# Patient Record
Sex: FEMALE | Race: WHITE | NOT HISPANIC OR LATINO | Employment: FULL TIME | URBAN - METROPOLITAN AREA
[De-identification: names, ages, dates, MRNs, and addresses within clinical notes are randomized per-mention and may not be internally consistent; named-entity substitution may affect disease eponyms.]

---

## 2017-01-27 ENCOUNTER — TRANSCRIBE ORDERS (OUTPATIENT)
Dept: ADMINISTRATIVE | Facility: HOSPITAL | Age: 57
End: 2017-01-27

## 2017-01-27 DIAGNOSIS — Z12.31 VISIT FOR SCREENING MAMMOGRAM: Primary | ICD-10-CM

## 2017-02-09 ENCOUNTER — APPOINTMENT (OUTPATIENT)
Dept: MAMMOGRAPHY | Facility: HOSPITAL | Age: 57
End: 2017-02-09
Attending: INTERNAL MEDICINE

## 2017-02-09 ENCOUNTER — APPOINTMENT (OUTPATIENT)
Dept: BONE DENSITY | Facility: HOSPITAL | Age: 57
End: 2017-02-09
Attending: INTERNAL MEDICINE

## 2017-02-22 ENCOUNTER — HOSPITAL ENCOUNTER (OUTPATIENT)
Dept: MAMMOGRAPHY | Facility: HOSPITAL | Age: 57
Discharge: HOME | End: 2017-02-22
Attending: INTERNAL MEDICINE | Admitting: INTERNAL MEDICINE

## 2017-02-22 ENCOUNTER — HOSPITAL ENCOUNTER (OUTPATIENT)
Dept: BONE DENSITY | Facility: HOSPITAL | Age: 57
Discharge: HOME | End: 2017-02-22
Attending: INTERNAL MEDICINE

## 2017-02-22 DIAGNOSIS — Z12.31 VISIT FOR SCREENING MAMMOGRAM: ICD-10-CM

## 2017-02-22 PROCEDURE — 77063 BREAST TOMOSYNTHESIS BI: CPT | Performed by: RADIOLOGY

## 2017-02-22 PROCEDURE — 77080 DXA BONE DENSITY AXIAL: CPT | Performed by: RADIOLOGY

## 2017-02-22 PROCEDURE — 77063 BREAST TOMOSYNTHESIS BI: CPT

## 2017-02-22 PROCEDURE — 77067 SCR MAMMO BI INCL CAD: CPT | Performed by: RADIOLOGY

## 2017-02-22 PROCEDURE — G0202 SCR MAMMO BI INCL CAD: HCPCS

## 2017-02-22 PROCEDURE — 77080 DXA BONE DENSITY AXIAL: CPT

## 2018-07-02 ENCOUNTER — HOSPITAL ENCOUNTER (INPATIENT)
Facility: HOSPITAL | Age: 58
LOS: 2 days | Discharge: HOME | End: 2018-07-04
Attending: EMERGENCY MEDICINE | Admitting: INTERNAL MEDICINE

## 2018-07-02 ENCOUNTER — APPOINTMENT (OUTPATIENT)
Dept: CT IMAGING | Facility: HOSPITAL | Age: 58
End: 2018-07-02

## 2018-07-02 DIAGNOSIS — R10.30 LOWER ABDOMINAL PAIN: ICD-10-CM

## 2018-07-02 DIAGNOSIS — K52.9 COLITIS, ACUTE: ICD-10-CM

## 2018-07-02 DIAGNOSIS — K92.2 ACUTE LOWER GI BLEEDING: Primary | ICD-10-CM

## 2018-07-02 PROBLEM — E78.5 HYPERLIPIDEMIA: Status: ACTIVE | Noted: 2018-07-02

## 2018-07-02 PROBLEM — I10 ESSENTIAL HYPERTENSION: Status: ACTIVE | Noted: 2018-07-02

## 2018-07-02 PROBLEM — D72.829 LEUKOCYTOSIS: Status: ACTIVE | Noted: 2018-07-02

## 2018-07-02 PROBLEM — E86.0 DEHYDRATION: Status: ACTIVE | Noted: 2018-07-02

## 2018-07-02 LAB
ABO GROUP BLD: NORMAL
ABO GROUP BLD: NORMAL
ALBUMIN SERPL-MCNC: 5.22 G/DL (ref 3.2–4.8)
ALBUMIN/GLOB SERPL: 1.7 G/DL (ref 1.5–2.5)
ALP SERPL-CCNC: 55 U/L (ref 25–100)
ALT SERPL W P-5'-P-CCNC: 33 U/L (ref 7–40)
ANION GAP SERPL CALCULATED.3IONS-SCNC: 12 MMOL/L (ref 3–11)
AST SERPL-CCNC: 27 U/L (ref 0–33)
BASOPHILS # BLD AUTO: 0.01 10*3/MM3 (ref 0–0.2)
BASOPHILS NFR BLD AUTO: 0.1 % (ref 0–1)
BILIRUB SERPL-MCNC: 1.1 MG/DL (ref 0.3–1.2)
BLD GP AB SCN SERPL QL: NEGATIVE
BUN BLD-MCNC: 17 MG/DL (ref 9–23)
BUN/CREAT SERPL: 19.1 (ref 7–25)
CALCIUM SPEC-SCNC: 10.5 MG/DL (ref 8.7–10.4)
CHLORIDE SERPL-SCNC: 102 MMOL/L (ref 99–109)
CO2 SERPL-SCNC: 27 MMOL/L (ref 20–31)
CREAT BLD-MCNC: 0.89 MG/DL (ref 0.6–1.3)
DEPRECATED RDW RBC AUTO: 42.1 FL (ref 37–54)
EOSINOPHIL # BLD AUTO: 0 10*3/MM3 (ref 0–0.3)
EOSINOPHIL NFR BLD AUTO: 0 % (ref 0–3)
ERYTHROCYTE [DISTWIDTH] IN BLOOD BY AUTOMATED COUNT: 12.8 % (ref 11.3–14.5)
GFR SERPL CREATININE-BSD FRML MDRD: 65 ML/MIN/1.73
GLOBULIN UR ELPH-MCNC: 3.1 GM/DL
GLUCOSE BLD-MCNC: 154 MG/DL (ref 70–100)
HCT VFR BLD AUTO: 49.8 % (ref 34.5–44)
HGB BLD-MCNC: 16.6 G/DL (ref 11.5–15.5)
HOLD SPECIMEN: NORMAL
HOLD SPECIMEN: NORMAL
IMM GRANULOCYTES # BLD: 0.05 10*3/MM3 (ref 0–0.03)
IMM GRANULOCYTES NFR BLD: 0.3 % (ref 0–0.6)
LYMPHOCYTES # BLD AUTO: 0.69 10*3/MM3 (ref 0.6–4.8)
LYMPHOCYTES NFR BLD AUTO: 4.4 % (ref 24–44)
MCH RBC QN AUTO: 29.9 PG (ref 27–31)
MCHC RBC AUTO-ENTMCNC: 33.3 G/DL (ref 32–36)
MCV RBC AUTO: 89.6 FL (ref 80–99)
MONOCYTES # BLD AUTO: 0.87 10*3/MM3 (ref 0–1)
MONOCYTES NFR BLD AUTO: 5.5 % (ref 0–12)
NEUTROPHILS # BLD AUTO: 14.08 10*3/MM3 (ref 1.5–8.3)
NEUTROPHILS NFR BLD AUTO: 89.7 % (ref 41–71)
PLATELET # BLD AUTO: 332 10*3/MM3 (ref 150–450)
PMV BLD AUTO: 8.6 FL (ref 6–12)
POTASSIUM BLD-SCNC: 4.2 MMOL/L (ref 3.5–5.5)
PROT SERPL-MCNC: 8.3 G/DL (ref 5.7–8.2)
RBC # BLD AUTO: 5.56 10*6/MM3 (ref 3.89–5.14)
RH BLD: POSITIVE
RH BLD: POSITIVE
SODIUM BLD-SCNC: 141 MMOL/L (ref 132–146)
T&S EXPIRATION DATE: NORMAL
WBC NRBC COR # BLD: 15.7 10*3/MM3 (ref 3.5–10.8)
WHOLE BLOOD HOLD SPECIMEN: NORMAL
WHOLE BLOOD HOLD SPECIMEN: NORMAL

## 2018-07-02 PROCEDURE — 99285 EMERGENCY DEPT VISIT HI MDM: CPT

## 2018-07-02 PROCEDURE — 86901 BLOOD TYPING SEROLOGIC RH(D): CPT

## 2018-07-02 PROCEDURE — 74177 CT ABD & PELVIS W/CONTRAST: CPT

## 2018-07-02 PROCEDURE — 86850 RBC ANTIBODY SCREEN: CPT

## 2018-07-02 PROCEDURE — 86900 BLOOD TYPING SEROLOGIC ABO: CPT

## 2018-07-02 PROCEDURE — 25010000002 IOPAMIDOL 61 % SOLUTION: Performed by: EMERGENCY MEDICINE

## 2018-07-02 PROCEDURE — 99223 1ST HOSP IP/OBS HIGH 75: CPT | Performed by: INTERNAL MEDICINE

## 2018-07-02 PROCEDURE — 80053 COMPREHEN METABOLIC PANEL: CPT

## 2018-07-02 PROCEDURE — 85025 COMPLETE CBC W/AUTO DIFF WBC: CPT

## 2018-07-02 PROCEDURE — 25010000002 PIPERACILLIN SOD-TAZOBACTAM PER 1 G: Performed by: EMERGENCY MEDICINE

## 2018-07-02 RX ORDER — SODIUM CHLORIDE 9 MG/ML
125 INJECTION, SOLUTION INTRAVENOUS CONTINUOUS
Status: DISCONTINUED | OUTPATIENT
Start: 2018-07-02 | End: 2018-07-04

## 2018-07-02 RX ORDER — SODIUM CHLORIDE 0.9 % (FLUSH) 0.9 %
1-10 SYRINGE (ML) INJECTION AS NEEDED
Status: DISCONTINUED | OUTPATIENT
Start: 2018-07-02 | End: 2018-07-04 | Stop reason: HOSPADM

## 2018-07-02 RX ORDER — DICYCLOMINE HYDROCHLORIDE 10 MG/1
10 CAPSULE ORAL 4 TIMES DAILY PRN
Status: DISCONTINUED | OUTPATIENT
Start: 2018-07-02 | End: 2018-07-04 | Stop reason: HOSPADM

## 2018-07-02 RX ORDER — ACETAMINOPHEN 325 MG/1
650 TABLET ORAL EVERY 6 HOURS PRN
Status: DISCONTINUED | OUTPATIENT
Start: 2018-07-02 | End: 2018-07-04 | Stop reason: HOSPADM

## 2018-07-02 RX ORDER — SODIUM CHLORIDE 0.9 % (FLUSH) 0.9 %
10 SYRINGE (ML) INJECTION AS NEEDED
Status: DISCONTINUED | OUTPATIENT
Start: 2018-07-02 | End: 2018-07-04 | Stop reason: HOSPADM

## 2018-07-02 RX ORDER — LISINOPRIL 5 MG/1
5 TABLET ORAL DAILY
COMMUNITY
End: 2019-01-07 | Stop reason: SDUPTHER

## 2018-07-02 RX ORDER — ATORVASTATIN CALCIUM 10 MG/1
10 TABLET, FILM COATED ORAL DAILY
Status: DISCONTINUED | OUTPATIENT
Start: 2018-07-03 | End: 2018-07-04 | Stop reason: HOSPADM

## 2018-07-02 RX ORDER — ONDANSETRON 2 MG/ML
4 INJECTION INTRAMUSCULAR; INTRAVENOUS EVERY 6 HOURS PRN
Status: DISCONTINUED | OUTPATIENT
Start: 2018-07-02 | End: 2018-07-04 | Stop reason: HOSPADM

## 2018-07-02 RX ORDER — LISINOPRIL 5 MG/1
5 TABLET ORAL DAILY
Status: DISCONTINUED | OUTPATIENT
Start: 2018-07-03 | End: 2018-07-04 | Stop reason: HOSPADM

## 2018-07-02 RX ADMIN — SODIUM CHLORIDE 1000 ML: 9 INJECTION, SOLUTION INTRAVENOUS at 17:06

## 2018-07-02 RX ADMIN — SODIUM CHLORIDE 1000 ML: 9 INJECTION, SOLUTION INTRAVENOUS at 19:37

## 2018-07-02 RX ADMIN — SODIUM CHLORIDE 125 ML/HR: 9 INJECTION, SOLUTION INTRAVENOUS at 22:23

## 2018-07-02 RX ADMIN — IOPAMIDOL 95 ML: 612 INJECTION, SOLUTION INTRAVENOUS at 17:27

## 2018-07-02 RX ADMIN — TAZOBACTAM SODIUM AND PIPERACILLIN SODIUM 4.5 G: 500; 4 INJECTION, SOLUTION INTRAVENOUS at 19:37

## 2018-07-02 RX ADMIN — Medication 10 ML: at 17:06

## 2018-07-02 NOTE — ED NOTES
PT  CAME TO DESK AND STATED THAT PT WAS PASSING BLOOD CLOTS THROUGH HER RECTUM AND FELT DIZZY. TRIAGE RN AWARE.     Magdalena Arriola  07/02/18 9219

## 2018-07-02 NOTE — ED PROVIDER NOTES
Subjective   Hiwot Ren is a 57 y.o.female who presents to the emergency department with complaints of intermittent abdominal cramping that began last night. The patient had a colonoscopy for a regular screening on 6/13 which was indicative of diverticulitis. She drank a colonoscopy prep last night but became concerned when she suddenly began experiencing bright red rectal bleeding around 0200 this morning. She became diaphoretic last night during a bout of severe abdominal cramping although her pain has somewhat improved on arrival. She currently reports feeling nauseated, fatigued, and generally weak. She also reports decreased appetite and PO intake over the last 12 to 24 hours due to pain and nausea unrelieved by Pepto bismol. She denies fever or any other acute systemic complaints. She is not on anticoagulants. She has not had any prior abdominal surgeries. She was treated for Hodgkin's lymphoma in her 30's that is now in remission. She denies alcohol or tobacco use.          History provided by:  Patient  Abdominal Pain   Pain location:  Generalized  Pain quality: cramping    Pain radiates to:  Does not radiate  Pain severity:  Severe  Onset quality:  Unable to specify  Duration:  1 day  Timing:  Intermittent  Progression:  Improving  Chronicity:  New  Context: not alcohol use    Relieved by:  Nothing  Worsened by:  Nothing  Ineffective treatments: Pepto bismol.  Associated symptoms: nausea    Associated symptoms: no fever    Risk factors: no alcohol abuse and has not had multiple surgeries        Review of Systems   Constitutional: Positive for appetite change and diaphoresis. Negative for fever.   Gastrointestinal: Positive for abdominal pain, anal bleeding and nausea.   All other systems reviewed and are negative.      Past Medical History:   Diagnosis Date   • Drug therapy    • Hodgkin's lymphoma (CMS/HCC)     chemo-25 yrs ago    • Hyperlipidemia    • Hypertension        No Known Allergies    Past  Surgical History:   Procedure Laterality Date   • BREAST BIOPSY     • BREAST CYST EXCISION         Family History   Problem Relation Age of Onset   • Breast cancer Maternal Aunt 18   • Lung cancer Mother    • Heart disease Father    • Parkinsonism Father        Social History     Social History   • Marital status:      Social History Main Topics   • Smoking status: Never Smoker   • Alcohol use No   • Drug use: No     Other Topics Concern   • Not on file         Objective   Physical Exam   Constitutional: She is oriented to person, place, and time. She appears well-developed and well-nourished. No distress.   HENT:   Head: Normocephalic and atraumatic.   Eyes: Conjunctivae are normal. No scleral icterus.   Neck: Normal range of motion. Neck supple.   Cardiovascular: Regular rhythm.  Tachycardia present.    Murmur heard.   Systolic murmur is present with a grade of 1/6   Pulmonary/Chest: Effort normal and breath sounds normal. No respiratory distress.   Abdominal: Soft. Bowel sounds are normal. There is generalized tenderness. There is no rebound and no guarding.   Mild diffuse abdominal tenderness.   Musculoskeletal: She exhibits no edema.   Neurological: She is alert and oriented to person, place, and time.   Skin: Skin is warm and dry. No erythema.   Psychiatric: She has a normal mood and affect. Her behavior is normal.   Nursing note and vitals reviewed.      Procedures         ED Course     She declined meds for pain/nausea.  Labs show leukocytosis, no anemia currently.  CT ordered for pain/tenderness, a significant colitis is noted.  Pt unlikely to have UC given benign C-scope just a couple of weeks ago.  High WBC, impressive colitis, active bleeding - will admit.  Fluids, abx.  Patient stable on serial rechecks.  Discussed exam findings, test results so far and concerns in detail at the bedside.  Discussed need for admission for further evaluation and treatment.                  MDM  Number of Diagnoses  or Management Options  Acute lower GI bleeding:   Colitis, acute:   Lower abdominal pain:      Amount and/or Complexity of Data Reviewed  Clinical lab tests: ordered and reviewed  Tests in the radiology section of CPT®: reviewed and ordered  Discuss the patient with other providers: yes  Independent visualization of images, tracings, or specimens: yes        Final diagnoses:   Acute lower GI bleeding   Colitis, acute   Lower abdominal pain       Documentation assistance provided by nick See.  Information recorded by the scribe was done at my direction and has been verified and validated by me.     Hamida See  07/02/18 1725       Hamida See  07/02/18 1924       Peter Tao MD  07/03/18 0023

## 2018-07-03 LAB
ANION GAP SERPL CALCULATED.3IONS-SCNC: 8 MMOL/L (ref 3–11)
BASOPHILS # BLD AUTO: 0.01 10*3/MM3 (ref 0–0.2)
BASOPHILS NFR BLD AUTO: 0.1 % (ref 0–1)
BUN BLD-MCNC: 13 MG/DL (ref 9–23)
BUN/CREAT SERPL: 19.7 (ref 7–25)
CALCIUM SPEC-SCNC: 8.1 MG/DL (ref 8.7–10.4)
CHLORIDE SERPL-SCNC: 106 MMOL/L (ref 99–109)
CO2 SERPL-SCNC: 24 MMOL/L (ref 20–31)
CREAT BLD-MCNC: 0.66 MG/DL (ref 0.6–1.3)
DEPRECATED RDW RBC AUTO: 42.8 FL (ref 37–54)
EOSINOPHIL # BLD AUTO: 0.02 10*3/MM3 (ref 0–0.3)
EOSINOPHIL NFR BLD AUTO: 0.1 % (ref 0–3)
ERYTHROCYTE [DISTWIDTH] IN BLOOD BY AUTOMATED COUNT: 13.1 % (ref 11.3–14.5)
GFR SERPL CREATININE-BSD FRML MDRD: 92 ML/MIN/1.73
GLUCOSE BLD-MCNC: 123 MG/DL (ref 70–100)
HCT VFR BLD AUTO: 41.6 % (ref 34.5–44)
HCT VFR BLD AUTO: 41.9 % (ref 34.5–44)
HCT VFR BLD AUTO: 43 % (ref 34.5–44)
HGB BLD-MCNC: 13.6 G/DL (ref 11.5–15.5)
HGB BLD-MCNC: 13.9 G/DL (ref 11.5–15.5)
HGB BLD-MCNC: 14.1 G/DL (ref 11.5–15.5)
IMM GRANULOCYTES # BLD: 0.04 10*3/MM3 (ref 0–0.03)
IMM GRANULOCYTES NFR BLD: 0.3 % (ref 0–0.6)
LYMPHOCYTES # BLD AUTO: 1.48 10*3/MM3 (ref 0.6–4.8)
LYMPHOCYTES NFR BLD AUTO: 10.4 % (ref 24–44)
MCH RBC QN AUTO: 29.4 PG (ref 27–31)
MCHC RBC AUTO-ENTMCNC: 32.8 G/DL (ref 32–36)
MCV RBC AUTO: 89.8 FL (ref 80–99)
MONOCYTES # BLD AUTO: 1.28 10*3/MM3 (ref 0–1)
MONOCYTES NFR BLD AUTO: 9 % (ref 0–12)
NEUTROPHILS # BLD AUTO: 11.46 10*3/MM3 (ref 1.5–8.3)
NEUTROPHILS NFR BLD AUTO: 80.1 % (ref 41–71)
PLATELET # BLD AUTO: 296 10*3/MM3 (ref 150–450)
PMV BLD AUTO: 8.9 FL (ref 6–12)
POTASSIUM BLD-SCNC: 3.7 MMOL/L (ref 3.5–5.5)
RBC # BLD AUTO: 4.79 10*6/MM3 (ref 3.89–5.14)
SODIUM BLD-SCNC: 138 MMOL/L (ref 132–146)
WBC NRBC COR # BLD: 14.29 10*3/MM3 (ref 3.5–10.8)

## 2018-07-03 PROCEDURE — 99232 SBSQ HOSP IP/OBS MODERATE 35: CPT | Performed by: HOSPITALIST

## 2018-07-03 PROCEDURE — 85018 HEMOGLOBIN: CPT | Performed by: NURSE PRACTITIONER

## 2018-07-03 PROCEDURE — 85014 HEMATOCRIT: CPT | Performed by: NURSE PRACTITIONER

## 2018-07-03 PROCEDURE — 25010000002 PIPERACILLIN SOD-TAZOBACTAM PER 1 G: Performed by: NURSE PRACTITIONER

## 2018-07-03 PROCEDURE — 99254 IP/OBS CNSLTJ NEW/EST MOD 60: CPT | Performed by: PHYSICIAN ASSISTANT

## 2018-07-03 PROCEDURE — 80048 BASIC METABOLIC PNL TOTAL CA: CPT | Performed by: NURSE PRACTITIONER

## 2018-07-03 PROCEDURE — 85025 COMPLETE CBC W/AUTO DIFF WBC: CPT | Performed by: NURSE PRACTITIONER

## 2018-07-03 RX ADMIN — ATORVASTATIN CALCIUM 10 MG: 10 TABLET, FILM COATED ORAL at 09:18

## 2018-07-03 RX ADMIN — TAZOBACTAM SODIUM AND PIPERACILLIN SODIUM 3.38 G: 375; 3 INJECTION, SOLUTION INTRAVENOUS at 01:46

## 2018-07-03 RX ADMIN — SODIUM CHLORIDE 125 ML/HR: 9 INJECTION, SOLUTION INTRAVENOUS at 09:17

## 2018-07-03 RX ADMIN — ACETAMINOPHEN 650 MG: 325 TABLET, FILM COATED ORAL at 13:48

## 2018-07-03 RX ADMIN — TAZOBACTAM SODIUM AND PIPERACILLIN SODIUM 3.38 G: 375; 3 INJECTION, SOLUTION INTRAVENOUS at 12:39

## 2018-07-03 RX ADMIN — TAZOBACTAM SODIUM AND PIPERACILLIN SODIUM 3.38 G: 375; 3 INJECTION, SOLUTION INTRAVENOUS at 18:04

## 2018-07-03 RX ADMIN — LISINOPRIL 5 MG: 5 TABLET ORAL at 09:18

## 2018-07-03 NOTE — PROGRESS NOTES
Discharge Planning Assessment  [facility]     Patient Name: Hiwot Ren  MRN: [id number]  Today's Date: 7/3/2018    Admit Date: 7/2/2018          Discharge Needs Assessment     Row Name 07/03/18 0942       Living Environment    Lives With spouse    Current Living Arrangements home/apartment/condo    Primary Care Provided by self    Provides Primary Care For no one    Family Caregiver if Needed spouse    Quality of Family Relationships supportive    Able to Return to Prior Arrangements yes       Resource/Environmental Concerns    Resource/Environmental Concerns none    Transportation Concerns car, none       Transition Planning    Patient/Family Anticipates Transition to home    Patient/Family Anticipated Services at Transition none    Transportation Anticipated family or friend will provide       Discharge Needs Assessment    Readmission Within the Last 30 Days no previous admission in last 30 days    Concerns to be Addressed no discharge needs identified    Equipment Currently Used at Home none    Anticipated Changes Related to Illness none    Equipment Needed After Discharge none            Discharge Plan     Row Name 07/03/18 0943       Plan    Plan Home    Patient/Family in Agreement with Plan yes    Plan Comments Spoke with pt at bedside. Pt is independent in ADL's and IADL's. Pt's PCP is Kimani Adams. Pt has prescription coverage with her insurance. Pt had no current discharge needs or concerns and plans to discharge home with her  when medically ready.     Final Discharge Disposition Code 01 - home or self-care        Destination     No service coordination in this encounter.      Durable Medical Equipment     No service coordination in this encounter.      Dialysis/Infusion     No service coordination in this encounter.      Home Medical Care     No service coordination in this encounter.      Social Care     No service coordination in this encounter.                Demographic Summary     Row Name  07/03/18 0942       General Information    Reason for Consult discharge planning            Functional Status     Row Name 07/03/18 0942       Functional Status, IADL    Medications independent    Meal Preparation independent    Housekeeping independent    Laundry independent    Shopping independent            Psychosocial    No documentation.           Abuse/Neglect    No documentation.           Legal    No documentation.           Substance Abuse    No documentation.           Patient Forms    No documentation.         RAISSA Sanderson

## 2018-07-03 NOTE — H&P
[facility] Medicine Services  HISTORY AND PHYSICAL    Patient Name: Hiwot Ren  : 1960  MRN: [id number]  Primary Care Physician: Kimani Adams MD    Subjective   Subjective     Chief Complaint:  Abdominal cramping, bright red blood from rectum     HPI:  Hiwot Ren is a 57 y.o. female with PMH significant for remote history of  Hodgkin's lymphoma, HTN, and HLD that presents to the ED with complaint of abdominal pain and BRBPR. She states that she began to have abdominal cramping with episodes of diarrhea in the early morning around 0200. She relates it to diarrhea similar to having a colon prep. Then around 8005-6918 she began to only pass bright red blood instead of stool. She did have several episodes of being diaphoretic with with abdominal cramping. She denies fever, SOA, or chest pain. She did try pepto bismol without relief.   Upon arrival to the ED, she is found to have leukocytosis as well as dehydration. CT abdomen is concerning for colitis.   She will be admitted to Hospital Medicine for further evaluation     Review of Systems   Constitutional: Positive for appetite change, diaphoresis and fatigue. Negative for activity change, chills, fever and unexpected weight change.   HENT: Negative.    Eyes: Negative for visual disturbance.   Respiratory: Negative for cough, shortness of breath and wheezing.    Cardiovascular: Negative for chest pain, palpitations and leg swelling.   Gastrointestinal: Positive for abdominal distention, abdominal pain, anal bleeding and diarrhea. Negative for nausea and rectal pain.   Genitourinary: Negative for difficulty urinating, dysuria, frequency and urgency.   Musculoskeletal: Negative for arthralgias and myalgias.   Skin: Negative for color change and pallor.   Neurological: Positive for weakness and light-headedness. Negative for dizziness, syncope and headaches.   Psychiatric/Behavioral: Negative for confusion. The patient is not  nervous/anxious.         Otherwise 10-system ROS reviewed and is negative except as mentioned in the HPI.    Personal History     Past Medical History:   Diagnosis Date   • Drug therapy    • Hodgkin's lymphoma (CMS/HCC)     chemo-25 yrs ago    • Hyperlipidemia    • Hypertension        Past Surgical History:   Procedure Laterality Date   • BREAST BIOPSY     • BREAST CYST EXCISION         Family History: family history includes Breast cancer (age of onset: 18) in her maternal aunt; Heart disease in her father; Lung cancer in her mother; Parkinsonism in her father.     Social History:  reports that she has never smoked. She does not have any smokeless tobacco history on file. She reports that she does not drink alcohol or use drugs.  Social History     Social History Narrative   • No narrative on file       Medications:  Prescriptions Prior to Admission   Medication Sig Dispense Refill Last Dose   • lisinopril (PRINIVIL,ZESTRIL) 5 MG tablet Take 5 mg by mouth Daily.      • SIMVASTATIN PO Take 40 mg by mouth.          No Known Allergies    Objective   Objective     Vital Signs:   Temp:  [99 °F (37.2 °C)-99.2 °F (37.3 °C)] 99.2 °F (37.3 °C)  Heart Rate:  [105-131] 105  Resp:  [14-16] 16  BP: (146-171)/(81-97) 159/81        Physical Exam   Constitutional: She is oriented to person, place, and time. She appears well-developed and well-nourished. No distress.   HENT:   Head: Normocephalic and atraumatic.   Eyes: Pupils are equal, round, and reactive to light.   Neck: Normal range of motion. Neck supple. No JVD present.   Cardiovascular: Regular rhythm and intact distal pulses.  Tachycardia present.  Exam reveals no gallop and no friction rub.    Murmur heard.  Pulmonary/Chest: Effort normal and breath sounds normal. No respiratory distress. She has no wheezes. She has no rales.   Abdominal: Soft. Bowel sounds are normal. She exhibits distension. She exhibits no mass. There is tenderness in the right lower quadrant,  suprapubic area and left lower quadrant. There is guarding.   Musculoskeletal: Normal range of motion. She exhibits no edema or tenderness.   Neurological: She is alert and oriented to person, place, and time.   Skin: Skin is warm and dry. Capillary refill takes less than 2 seconds. No erythema. No pallor.   Psychiatric: She has a normal mood and affect. Her behavior is normal. Thought content normal.   Vitals reviewed.      Results Reviewed:  I have personally reviewed current lab, radiology, and data and agree.      Results from last 7 days  Lab Units 07/02/18  1436   WBC 10*3/mm3 15.70*   HEMOGLOBIN g/dL 16.6*   HEMATOCRIT % 49.8*   PLATELETS 10*3/mm3 332       Results from last 7 days  Lab Units 07/02/18  1436   SODIUM mmol/L 141   POTASSIUM mmol/L 4.2   CHLORIDE mmol/L 102   CO2 mmol/L 27.0   BUN mg/dL 17   CREATININE mg/dL 0.89   GLUCOSE mg/dL 154*   CALCIUM mg/dL 10.5*   ALT (SGPT) U/L 33   AST (SGOT) U/L 27     Estimated Creatinine Clearance: 66.7 mL/min (by C-G formula based on SCr of 0.89 mg/dL).  Brief Urine Lab Results     None        No results found for: BNP  Imaging Results (last 24 hours)     Procedure Component Value Units Date/Time    CT Abdomen Pelvis With Contrast [[id number]] Updated:  07/02/18 7907             Assessment/Plan   Assessment / Plan     Hospital Problem List     * (Principal)Colitis    Essential hypertension    Hyperlipidemia    Dehydration    Leukocytosis    Lower GI bleed            Assessment & Plan:  57 year old female presenting to the ED with complaint of abdominal pain, diarrhea, and rectal bleeding who is found to have colitis with lower GI bleed.     Colitis  -Recent colonoscopy 6/13/18 revealing diverticulosis  -No prior history   -Zosyn started in the ED, will continue for now  -CBC in am    Lower GI Bleed  -serial H&H 24 hours  -IVF    Dehydration  - 2L IVF bolus in ED  -Continue with IVF overnight  -BMP in am     Hypertension  -Continue home  medications    Hyperlipidemia  -Continue home medications    DVT prophylaxis:  -No pharmacologic secondary to GI bleed  -Teds/scds    CODE STATUS:    Code Status and Medical Interventions:   Ordered at: 07/02/18 2028     Code Status:    CPR     Medical Interventions (Level of Support Prior to Arrest):    Full       Admission Status:  I believe this patient meets INPATIENT status due to the need for care which can only be reasonably provided in an hospital setting such as aggressive/expedited ancillary services and/or consultation services, the necessity for IV medications, close physician monitoring and/or the possible need for procedures.  In such, I feel patient’s risk for adverse outcomes and need for care warrant INPATIENT evaluation and predict the patient’s care encounter to likely last beyond 2 midnights.      Electronically signed by BEENA Kennedy, 07/02/18, 8:01 PM.      Brief Attending Admission Attestation     I have seen and examined the patient, performing an independent face-to-face diagnostic evaluation with plan of care reviewed and developed with Ms. Chun      Brief Summary Statement/HPI:   Hiwot Ren is a 57 y.o. female with PMH of Hodgkins Lymphoma, HTN, HLD who presents with diarrhea. Pt states that she has had diarrhea for the last few days and that in the last 24 hours she has had BRBPR.  She denies F/C or any significant weight loss. Of note, she had reportedly normal colonoscopy three weeks ago with [facility].       Attending Physical Exam:  Constitutional: No acute distress, awake, alert  Eyes: PERRLA, sclerae anicteric, no conjunctival injection  HENT: NCAT, mucous membranes moist  Neck: Supple, no thyromegaly, no lymphadenopathy, trachea midline  Respiratory: Clear to auscultation bilaterally, nonlabored respirations   Cardiovascular: RRR, no murmurs, rubs, or gallops, palpable pedal pulses bilaterally  Gastrointestinal: Positive bowel sounds, soft, nontender,  nondistended  Musculoskeletal: No bilateral ankle edema, no clubbing or cyanosis to extremities  Psychiatric: Appropriate affect, cooperative  Neurologic: Oriented x 3, strength symmetric in all extremities, Cranial Nerves grossly intact to confrontation, speech clear  Skin: No rashes      Brief Assessment/Plan :  Ms. Ren is a 56 yo WF w/ PMH of HTN, HLD and remote h/o Hodgkin's Lymphoma who presents with diarrhea, BRBPR and reported CT evidence of nearly diffuse colitis.    Plan:  --continue IV ABX, continue IVFs, consult GI in am.   See above for further detailed assessment and plan developed with APC which I have reviewed and/or edited.      Electronically signed by Joyce Jacobs MD, 07/03/18, 12:39 AM.

## 2018-07-03 NOTE — NURSING NOTE
Notified MIRZA SAEZ regarding order for Cdiff and patient not meeting Cdiff algorithm criteria. Stated they needed to rule out infection for colitis. Stated criteria we have on algorithm and patient told nurse SATNAM Vilchis that she has not had diarrhea for a few days. Stated she will revaluate it when she sees patient.

## 2018-07-03 NOTE — CONSULTS
[facility] Gastroenterology Consult    Referring Provider: Joyce Jacobs MD   PCP: Kimani Adams MD    Reason for Consultation: Colitis     Chief complaint: Diarrhea and bright red blood per rectum     History of present illness:    Hiwot Ren is a 57 y.o. female who is admitted with colitis.  She reports developing diarrhea three days ago.  She awoke in the night with severe, stabbing abdominal pain.  She began having bright red blood per rectum yesterday.  Her diarrhea and abdominal pain have improved.  She continues to have bright red blood per rectum but this has also slowed down.  She recently underwent a colonoscopy with [facility] three weeks ago that was unremarkable.    She denies any recent antibiotic use.   No unintentional weight loss.  She denies fever or chills.  She has no history of tobacco use, constipation nor hormone use.      Allergies:  Patient has no known allergies.    Scheduled Meds:    atorvastatin 10 mg Oral Daily   lisinopril 5 mg Oral Daily   piperacillin-tazobactam 3.375 g Intravenous Q8H        Infusions:    sodium chloride 125 mL/hr Last Rate: 125 mL/hr (07/03/18 0917)       PRN Meds:  •  acetaminophen  •  dicyclomine  •  ondansetron  •  sodium chloride  •  sodium chloride    Home Meds:  Prescriptions Prior to Admission   Medication Sig Dispense Refill Last Dose   • lisinopril (PRINIVIL,ZESTRIL) 5 MG tablet Take 5 mg by mouth Daily.      • SIMVASTATIN PO Take 40 mg by mouth.          ROS: Review of Systems   Constitutional: Positive for fatigue.   HENT: Negative for trouble swallowing and voice change.    Eyes: Negative.    Respiratory: Negative.    Cardiovascular: Negative.    Gastrointestinal: Positive for abdominal pain, blood in stool and diarrhea.   Endocrine: Negative.    Genitourinary: Negative.    Musculoskeletal: Negative.    Skin: Negative.    Allergic/Immunologic: Negative.    Neurological: Negative.    Hematological: Negative.    Psychiatric/Behavioral: Negative.   "      PAST MED HX:  Past Medical History:   Diagnosis Date   • Drug therapy    • Hodgkin's lymphoma (CMS/HCC)     chemo-25 yrs ago    • Hyperlipidemia    • Hypertension        PAST SURG HX:  Past Surgical History:   Procedure Laterality Date   • BREAST BIOPSY     • BREAST CYST EXCISION         FAM HX:  Family History   Problem Relation Age of Onset   • Breast cancer Maternal Aunt 18   • Lung cancer Mother    • Heart disease Father    • Parkinsonism Father        SOC HX:  Social History     Social History   • Marital status:      Spouse name: N/A   • Number of children: N/A   • Years of education: N/A     Occupational History   • Not on file.     Social History Main Topics   • Smoking status: Never Smoker   • Smokeless tobacco: Not on file   • Alcohol use No   • Drug use: No   • Sexual activity: Not on file     Other Topics Concern   • Not on file     Social History Narrative   • No narrative on file       PHYSICAL EXAM  /80   Pulse 101   Temp 98.2 °F (36.8 °C) (Oral)   Resp 16   Ht 160 cm (63\")   Wt 72.9 kg (160 lb 11.5 oz)   SpO2 96%   BMI 28.47 kg/m²   Wt Readings from Last 3 Encounters:   07/02/18 72.9 kg (160 lb 11.5 oz)   ,body mass index is 28.47 kg/m².  Physical Exam   Constitutional: She is oriented to person, place, and time. She appears well-developed and well-nourished. No distress.   HENT:   Head: Normocephalic and atraumatic.   Eyes: No scleral icterus.   Neck: Normal range of motion.   Cardiovascular: Normal rate and regular rhythm.    Pulmonary/Chest: Effort normal. No respiratory distress.   Abdominal: Soft. Bowel sounds are normal. She exhibits no distension. There is no tenderness.   Musculoskeletal: She exhibits no edema.   Neurological: She is alert and oriented to person, place, and time.   Skin: Skin is warm and dry.   Psychiatric: She has a normal mood and affect. Her behavior is normal.       Results Review:   I reviewed the patient's new clinical results.    Lab Results "   Component Value Date    WBC 14.29 (H) 07/03/2018    HGB 13.6 07/03/2018    HGB 14.1 07/03/2018    HGB 13.9 07/03/2018    HCT 41.6 07/03/2018    MCV 89.8 07/03/2018     07/03/2018       No results found for: INR    Lab Results   Component Value Date    GLUCOSE 123 (H) 07/03/2018    BUN 13 07/03/2018    CREATININE 0.66 07/03/2018    EGFRIFNONA 92 07/03/2018    BCR 19.7 07/03/2018    CO2 24.0 07/03/2018    CALCIUM 8.1 (L) 07/03/2018    ALBUMIN 5.22 (H) 07/02/2018    ALKPHOS 55 07/02/2018    BILITOT 1.1 07/02/2018    ALT 33 07/02/2018    AST 27 07/02/2018     CT abdomen/pelvis- left sided colitis     ASSESSMENTS/PLANS    1. Colitis, suspect ischemic  2. Abdominal pain, secondary to above, resolved  3. Bright red blood per rectum, improving  4. Diarrhea     Suspect ischemic colitis.  Patient is hemodynamically stable at this time.  Recommend medical management.  Will rule out infectious etiology.  >>> Obtain stool culture and C. Difficile   >>> Continue supportive care.  Will obtain records regarding recent colonoscopy.      Will advance diet.      I discussed the patients findings and my recommendations with patient    NADJA Mack  07/03/18  1:42 PM

## 2018-07-03 NOTE — PROGRESS NOTES
[facility] Medicine Services  PROGRESS NOTE    Patient Name: Hiwot Ren  : 1960  MRN: [id number]    Date of Admission: 2018  Length of Stay: 1  Primary Care Physician: Kimani Adams MD    Subjective   Subjective     CC:  F/u colitis    HPI:  Doing better and felt hungry, tolerated reg diet that was advanced by GI earlier when they saw her. No N/V. Abd pain has improved. No fever or chills. No further significant blood per rectum.    Review of Systems  Otherwise ROS is negative except as mentioned in the HPI.    Objective   Objective     Vital Signs:   Temp:  [98.2 °F (36.8 °C)-99.2 °F (37.3 °C)] 98.9 °F (37.2 °C)  Heart Rate:  [100-115] 109  Resp:  [16-18] 18  BP: (113-164)/(62-97) 113/62        Physical Exam:  General Assessment: No acute cardiopulmonary distress. Well developed and well nourished.    HEENT: NCAT, PERRL, MM moist    Neck: Supple    CVS: RRR, S1S2 normal, no murmurs    Resp: CTAB, no adventitious sound    Abd: soft, mild tenderness, ND, normal BS, no guarding or peritoneal signs    Ext: No edema, both calves are symmetric and NTTP    Neuro: Nonfocal    Skin: W/D/I. No rash.    Psych: Affect is appropriate      Results Reviewed:  I have personally reviewed current lab, radiology, and data and agree.      Results from last 7 days  Lab Units 18  1133 18  0600 18  0327 18  1436   WBC 10*3/mm3  --  14.29*  --  15.70*   HEMOGLOBIN g/dL 13.6 14.1 13.9 16.6*   HEMATOCRIT % 41.6 43.0 41.9 49.8*   PLATELETS 10*3/mm3  --  296  --  332       Results from last 7 days  Lab Units 18  0327 18  1436   SODIUM mmol/L 138 141   POTASSIUM mmol/L 3.7 4.2   CHLORIDE mmol/L 106 102   CO2 mmol/L 24.0 27.0   BUN mg/dL 13 17   CREATININE mg/dL 0.66 0.89   GLUCOSE mg/dL 123* 154*   CALCIUM mg/dL 8.1* 10.5*   ALT (SGPT) U/L  --  33   AST (SGOT) U/L  --  27     Estimated Creatinine Clearance: 90 mL/min (by C-G formula based on SCr of 0.66  mg/dL).  No results found for: BNP    Microbiology Results Abnormal     None          Imaging Results (last 24 hours)     Procedure Component Value Units Date/Time    CT Abdomen Pelvis With Contrast [[id number]] Collected:  07/03/18 0911     Updated:  07/03/18 0917    Narrative:       EXAMINATION: CT ABDOMEN AND PELVIS W CONTRAST-      INDICATION: Lower abdominal pain, rectal bleeding.      TECHNIQUE: CT scan of the abdomen and pelvis was performed with  intravenous contrast.     The radiation dose reduction device was turned on for each scan per the  ALARA (As Low as Reasonably Achievable) protocol.     FINDINGS: The most superior images demonstrate no basilar pulmonary  inflammatory process or pleural effusion.      The liver demonstrates incidental simple cysts. The liver is otherwise  normal. The spleen is normal. There is no adrenal or pancreatic mass.  There is no renal mass, stone or obstruction. There is marked diffuse  edema with luminal narrowing involving the mid transverse colon, splenic  flexure, descending colon, sigmoid colon and rectum. There is no pelvic  mass or fluid. There is no ascites, aneurysm or retroperitoneal  lymphadenopathy. There is a small amount of fluid in both paracolic  gutters.       Impression:       1. There is marked edema involving a continuous segment of the colon  starting in the mid transverse colon, extending through the splenic  flexure into the descending colon, sigmoid colon and rectum.  2. The differential diagnosis would include ulcerative colitis or  ischemic colitis. In general, the patient does not appear to have  significant vascular disease.     D:  07/02/2018  E:  07/03/2018     This report was finalized on 7/3/2018 9:15 AM by Dr. Bogdan Mariano MD.                I have reviewed the medications.    Assessment/Plan   Assessment / Plan     Hospital Problem List     * (Principal)Colitis    Essential hypertension    Hyperlipidemia    Dehydration    Leukocytosis     Lower GI bleed             Brief Hospital Course to date:  Hiwot Ren is a 57 y.o. female with history of HTN/ HLD and diverticular disease, who recently had a routine screening C'scope with Dr Blanton about 3 weeks ago, admitted with colitis.      Assessment & Plan:  - Appreciate GI eval, they suspect ischemic etiology, but still ruling out for infectious cause as well.  - Improving with abx  - Monitor H/H    DVT Prophylaxis:  mechanical    CODE STATUS:   Code Status and Medical Interventions:   Ordered at: 07/02/18 2028     Code Status:    CPR     Medical Interventions (Level of Support Prior to Arrest):    Full       Disposition: TBD, hopefully home in 1-2 days      Electronically signed by Aurelia Hernandez MD, 07/03/18, 4:20 PM.

## 2018-07-03 NOTE — PLAN OF CARE
Problem: Patient Care Overview  Goal: Plan of Care Review  Outcome: Ongoing (interventions implemented as appropriate)   07/02/18 2034   Coping/Psychosocial   Plan of Care Reviewed With patient;spouse;family   OTHER   Outcome Summary resting comfortable, VSS     Goal: Individualization and Mutuality  Outcome: Ongoing (interventions implemented as appropriate)    Goal: Discharge Needs Assessment  Outcome: Ongoing (interventions implemented as appropriate)    Goal: Interprofessional Rounds/Family Conf  Outcome: Ongoing (interventions implemented as appropriate)      Problem: Pain, Acute (Adult)  Goal: Identify Related Risk Factors and Signs and Symptoms  Outcome: Ongoing (interventions implemented as appropriate)    Goal: Acceptable Pain Control/Comfort Level  Outcome: Ongoing (interventions implemented as appropriate)

## 2018-07-04 VITALS
OXYGEN SATURATION: 95 % | DIASTOLIC BLOOD PRESSURE: 81 MMHG | WEIGHT: 160.72 LBS | HEIGHT: 63 IN | SYSTOLIC BLOOD PRESSURE: 136 MMHG | RESPIRATION RATE: 18 BRPM | BODY MASS INDEX: 28.48 KG/M2 | TEMPERATURE: 99 F | HEART RATE: 90 BPM

## 2018-07-04 LAB
HCT VFR BLD AUTO: 43.1 % (ref 34.5–44)
HGB BLD-MCNC: 13.8 G/DL (ref 11.5–15.5)

## 2018-07-04 PROCEDURE — 25010000002 PIPERACILLIN SOD-TAZOBACTAM PER 1 G: Performed by: NURSE PRACTITIONER

## 2018-07-04 PROCEDURE — 85018 HEMOGLOBIN: CPT | Performed by: HOSPITALIST

## 2018-07-04 PROCEDURE — 85014 HEMATOCRIT: CPT | Performed by: HOSPITALIST

## 2018-07-04 PROCEDURE — 87046 STOOL CULTR AEROBIC BACT EA: CPT | Performed by: PHYSICIAN ASSISTANT

## 2018-07-04 PROCEDURE — 87045 FECES CULTURE AEROBIC BACT: CPT | Performed by: PHYSICIAN ASSISTANT

## 2018-07-04 PROCEDURE — 99232 SBSQ HOSP IP/OBS MODERATE 35: CPT | Performed by: INTERNAL MEDICINE

## 2018-07-04 PROCEDURE — 99239 HOSP IP/OBS DSCHRG MGMT >30: CPT | Performed by: INTERNAL MEDICINE

## 2018-07-04 RX ORDER — DICYCLOMINE HYDROCHLORIDE 10 MG/1
10 CAPSULE ORAL 4 TIMES DAILY PRN
Qty: 20 CAPSULE | Refills: 0 | Status: SHIPPED | OUTPATIENT
Start: 2018-07-04 | End: 2019-10-07

## 2018-07-04 RX ADMIN — TAZOBACTAM SODIUM AND PIPERACILLIN SODIUM 3.38 G: 375; 3 INJECTION, SOLUTION INTRAVENOUS at 01:59

## 2018-07-04 RX ADMIN — ACETAMINOPHEN 650 MG: 325 TABLET, FILM COATED ORAL at 00:23

## 2018-07-04 RX ADMIN — ATORVASTATIN CALCIUM 10 MG: 10 TABLET, FILM COATED ORAL at 09:27

## 2018-07-04 RX ADMIN — SODIUM CHLORIDE 125 ML/HR: 9 INJECTION, SOLUTION INTRAVENOUS at 01:59

## 2018-07-04 RX ADMIN — SODIUM CHLORIDE 125 ML/HR: 9 INJECTION, SOLUTION INTRAVENOUS at 09:27

## 2018-07-04 RX ADMIN — LISINOPRIL 5 MG: 5 TABLET ORAL at 09:27

## 2018-07-04 RX ADMIN — TAZOBACTAM SODIUM AND PIPERACILLIN SODIUM 3.38 G: 375; 3 INJECTION, SOLUTION INTRAVENOUS at 10:40

## 2018-07-04 NOTE — PLAN OF CARE
Problem: Patient Care Overview  Goal: Discharge Needs Assessment  Outcome: Outcome(s) achieved Date Met: 07/04/18

## 2018-07-04 NOTE — PROGRESS NOTES
[facility] Medicine Services  PROGRESS NOTE    Patient Name: Hiwot Ren  : 1960  MRN: [id number]    Date of Admission: 2018  Length of Stay: 2  Primary Care Physician: Kimani Adams MD    Subjective   Subjective     CC:  F/u colitis    HPI:  Abdominal pain improved. Patient having formed stools this morning, some blood still there. She denies feve/chills, nausea/vomiting.    Review of Systems  Otherwise ROS is negative except as mentioned in the HPI.    Objective   Objective     Vital Signs:   Temp:  [98.4 °F (36.9 °C)-98.9 °F (37.2 °C)] 98.6 °F (37 °C)  Heart Rate:  [] 88  Resp:  [18] 18  BP: (111-162)/(62-77) 162/70        Physical Exam:  Constitutional: No acute distress, awake, alert  HENT: NCAT, mucous membranes moist  Respiratory: Clear to auscultation bilaterally, respiratory effort normal   Cardiovascular: RRR, no murmurs, rubs, or gallops, palpable pedal pulses bilaterally  Gastrointestinal: Positive bowel sounds, soft, nontender, nondistended  Musculoskeletal: No bilateral ankle edema  Psychiatric: Appropriate affect, cooperative  Neurologic: Oriented x 3, strength symmetric in all extremities, Cranial Nerves grossly intact to confrontation, speech clear  Skin: No rashes        Results Reviewed:  I have personally reviewed current lab, radiology, and data and agree.      Results from last 7 days  Lab Units 18  0618 18  1133 18  0600  18  1436   WBC 10*3/mm3  --   --  14.29*  --  15.70*   HEMOGLOBIN g/dL 13.8 13.6 14.1  < > 16.6*   HEMATOCRIT % 43.1 41.6 43.0  < > 49.8*   PLATELETS 10*3/mm3  --   --  296  --  332   < > = values in this interval not displayed.    Results from last 7 days  Lab Units 18  0327 18  1436   SODIUM mmol/L 138 141   POTASSIUM mmol/L 3.7 4.2   CHLORIDE mmol/L 106 102   CO2 mmol/L 24.0 27.0   BUN mg/dL 13 17   CREATININE mg/dL 0.66 0.89   GLUCOSE mg/dL 123* 154*   CALCIUM mg/dL 8.1* 10.5*   ALT  (SGPT) U/L  --  33   AST (SGOT) U/L  --  27     Estimated Creatinine Clearance: 90 mL/min (by C-G formula based on SCr of 0.66 mg/dL).  No results found for: BNP    Microbiology Results Abnormal     None          Imaging Results (last 24 hours)     ** No results found for the last 24 hours. **             I have reviewed the medications.    Assessment/Plan   Assessment / Plan     Hospital Problem List     * (Principal)Colitis    Essential hypertension    Hyperlipidemia    Dehydration    Leukocytosis    Lower GI bleed             Brief Hospital Course to date:  Hiwot Ren is a 57 y.o. female with history of HTN/ HLD and diverticular disease, who recently had a routine screening C'scope with Dr Blanton about 3 weeks ago, admitted with colitis.    Assessment & Plan:  - Appreciate GI eval, they suspect ischemic etiology, but still ruling out for infectious causes, stool studies pending, but given no further episodes of diarrhea doubt C.diff  - Leukocytosis Improving with abx, continue for now. No fever, hemodynamically stable  - H/H stable  - continue home blood pressure medications    DVT Prophylaxis:  mechanical    CODE STATUS:   Code Status and Medical Interventions:   Ordered at: 07/02/18 2028     Code Status:    CPR     Medical Interventions (Level of Support Prior to Arrest):    Full       Disposition: TBD, hopefully home in 1-2 days      Electronically signed by Debroah Kuhn DO, 07/04/18, 11:50 AM.

## 2018-07-04 NOTE — PROGRESS NOTES
"GI Daily Progress Note  Subjective:    Chief Complaint:  Abdominal pain and bloody diarrhea.    Patient has brisk gastro-colic reflex, bloating, and abdominal cramping. Bloody diarrhea is improving. Abdominal pain is improving.    Objective:    /81   Pulse 90   Temp 99 °F (37.2 °C) (Oral)   Resp 18   Ht 160 cm (63\")   Wt 72.9 kg (160 lb 11.5 oz)   SpO2 95%   BMI 28.47 kg/m²     Physical Exam   Constitutional: She is oriented to person, place, and time. She appears well-developed and well-nourished. No distress.   HENT:   Head: Normocephalic.   Eyes: Conjunctivae are normal. No scleral icterus.   Neck: Normal range of motion.   Cardiovascular: Normal rate and regular rhythm.    No murmur heard.  Pulmonary/Chest: Effort normal and breath sounds normal. No respiratory distress. She has no wheezes. She has no rales.   Abdominal: Soft. Bowel sounds are normal. She exhibits no distension and no mass. There is tenderness. There is no guarding.   Musculoskeletal: Normal range of motion. She exhibits no edema.   Neurological: She is alert and oriented to person, place, and time.   Skin: Skin is warm and dry. Capillary refill takes less than 2 seconds.   Psychiatric: She has a normal mood and affect. Her behavior is normal.   Nursing note and vitals reviewed.      Lab  Lab Results   Component Value Date    WBC 14.29 (H) 07/03/2018    HGB 13.8 07/04/2018    HGB 13.6 07/03/2018    HGB 14.1 07/03/2018    MCV 89.8 07/03/2018     07/03/2018       Lab Results   Component Value Date    GLUCOSE 123 (H) 07/03/2018    BUN 13 07/03/2018    CREATININE 0.66 07/03/2018    EGFRIFNONA 92 07/03/2018    BCR 19.7 07/03/2018    CO2 24.0 07/03/2018    CALCIUM 8.1 (L) 07/03/2018    ALBUMIN 5.22 (H) 07/02/2018    ALKPHOS 55 07/02/2018    BILITOT 1.1 07/02/2018    ALT 33 07/02/2018    AST 27 07/02/2018       Assessment:    Acute reversible segmental vascular insufficiency of colon.  Abdominal pain, improving.  Hematochezia, " improved.    Plan:    >> Start 81 mg ASA in 1 week.  >> Maintain hydration and decrease caffeinated drinks.  >> Expect abdominal cramping and fecal urgency for 1-2 weeks, with gradual daily improvement.  >> Defer colonoscopy, given recent negative exam and very classic presentation for colonic ischemia.  >> If symptoms recurrent in the near future, would confirm dx with colonoscopy and perform thrombophilia work-up.    OK for discharge.    Recommend back to work on 7/9/18.    Mark I. Brunner, MD  07/04/18  2:57 PM

## 2018-07-04 NOTE — PLAN OF CARE
Problem: Patient Care Overview  Goal: Individualization and Mutuality  Outcome: Outcome(s) achieved Date Met: 07/04/18

## 2018-07-04 NOTE — PLAN OF CARE
Problem: Patient Care Overview  Goal: Plan of Care Review  Outcome: Ongoing (interventions implemented as appropriate)   07/04/18 0321   Coping/Psychosocial   Plan of Care Reviewed With patient   OTHER   Outcome Summary No stools since 7/1. Only c/o pain is head ache related. C diff stool ordered so contact isolation maintained. Is hopeful of going home today.   Plan of Care Review   Progress improving     Goal: Discharge Needs Assessment  Outcome: Ongoing (interventions implemented as appropriate)      Problem: Pain, Acute (Adult)  Goal: Identify Related Risk Factors and Signs and Symptoms  Outcome: Ongoing (interventions implemented as appropriate)    Goal: Acceptable Pain Control/Comfort Level  Outcome: Ongoing (interventions implemented as appropriate)

## 2018-07-04 NOTE — DISCHARGE SUMMARY
[facility] Medicine Services  DISCHARGE SUMMARY    Patient Name: Hiwot Ren  : 1960  MRN: [id number]    Date of Admission: 2018  Date of Discharge:  2018  Primary Care Physician: Kimani Adams MD    Consults     Date and Time Order Name Status Description    7/3/2018 0043 Inpatient Gastroenterology Consult Completed         Hospital Course     Presenting Problem:   Colitis [K52.9]    Active Hospital Problems    Diagnosis Date Noted   • **Colitis [K52.9] 2018   • Essential hypertension [I10] 2018   • Hyperlipidemia [E78.5] 2018   • Dehydration [E86.0] 2018   • Leukocytosis [D72.829] 2018   • Lower GI bleed [K92.2] 2018      Resolved Hospital Problems    Diagnosis Date Noted Date Resolved   No resolved problems to display.          Hospital Course:  Hiwot Ren is a 57 y.o. female with  history of  Hodgkin's lymphoma, HTN, and HLD that presented to the ED with complaint of abdominal pain and BRBPR. Upon arrival to the ED, she was found to have leukocytosis as well as dehydration. CT abdomen is concerning for colitis. Patient had recently undergone colonoscopy  revealing diverticulosis. Patient was aggressively hydrated and started on IV abx. H&H was trended. GI was consulted. Stool cultures were obtained, however GI felt diagnosis most consistent with ischemic colitis (minimal risk factors, other than HLD). Patient did not undergo repeat colonoscopy given that it was recently performed outpatient. Her GIB resolved and her hemoglobin remained stable. She was tolerating diet. Discussed further management with GI/Dr. Brunner. Patient to start ASA 81mg daily in one week. No further work-up at this time unless symptoms reoccur, at that time recommendation would be for repeat colonoscopy and thrombophilia work-up. No further need for antibiotics. Patient okay to return to work on . She was told to expect abdominal cramping  and fecal urgency for 1-2 weeks, with gradual daily improvement. She has been provided with PRN bentyl at d/c for symptomatic relief.    Discharge Follow Up Recommendations for labs/diagnostics:  If symptoms recurrent in the near future, would confirm dx with colonoscopy and perform thrombophilia work-up.      Day of Discharge     HPI:   Feels better. No further bleeding. Tolerating diet. No diarrhea, having formed stools. No fever/chills. No nausea/vomiting. Abdominal pain improved.    Review of Systems  Gen- No fevers, chills  CV- No chest pain, palpitations  Resp- No cough, dyspnea  GI- No N/V/D, abd pain    Otherwise ROS is negative except as mentioned in the HPI.    Vital Signs:   Temp:  [98.4 °F (36.9 °C)-99 °F (37.2 °C)] 99 °F (37.2 °C)  Heart Rate:  [] 90  Resp:  [18] 18  BP: (111-162)/(62-81) 136/81     Physical Exam:  Constitutional: No acute distress, awake, alert  HENT: NCAT, mucous membranes moist  Respiratory: Clear to auscultation bilaterally, respiratory effort normal   Cardiovascular: RRR, no murmurs, rubs, or gallops, palpable pedal pulses bilaterally  Gastrointestinal: Positive bowel sounds, soft, nontender, nondistended  Musculoskeletal: No bilateral ankle edema  Psychiatric: Appropriate affect, cooperative  Neurologic: Oriented x 3, strength symmetric in all extremities, Cranial Nerves grossly intact to confrontation, speech clear  Skin: No rashes    Pertinent  and/or Most Recent Results       Results from last 7 days  Lab Units 07/04/18  0618 07/03/18  1133 07/03/18  0600 07/03/18  0327 07/02/18  1436   WBC 10*3/mm3  --   --  14.29*  --  15.70*   HEMOGLOBIN g/dL 13.8 13.6 14.1 13.9 16.6*   HEMATOCRIT % 43.1 41.6 43.0 41.9 49.8*   PLATELETS 10*3/mm3  --   --  296  --  332   SODIUM mmol/L  --   --   --  138 141   POTASSIUM mmol/L  --   --   --  3.7 4.2   CHLORIDE mmol/L  --   --   --  106 102   CO2 mmol/L  --   --   --  24.0 27.0   BUN mg/dL  --   --   --  13 17   CREATININE mg/dL  --   --    --  0.66 0.89   GLUCOSE mg/dL  --   --   --  123* 154*   CALCIUM mg/dL  --   --   --  8.1* 10.5*       Results from last 7 days  Lab Units 07/02/18  1436   BILIRUBIN mg/dL 1.1   ALK PHOS U/L 55   ALT (SGPT) U/L 33   AST (SGOT) U/L 27           Invalid input(s): TG, LDLCALC, LDLREALC      Brief Urine Lab Results     None          Microbiology Results Abnormal     None          Imaging Results (all)     Procedure Component Value Units Date/Time    CT Abdomen Pelvis With Contrast [[id number]] Collected:  07/03/18 0911     Updated:  07/03/18 0917    Narrative:       EXAMINATION: CT ABDOMEN AND PELVIS W CONTRAST-      INDICATION: Lower abdominal pain, rectal bleeding.      TECHNIQUE: CT scan of the abdomen and pelvis was performed with  intravenous contrast.     The radiation dose reduction device was turned on for each scan per the  ALARA (As Low as Reasonably Achievable) protocol.     FINDINGS: The most superior images demonstrate no basilar pulmonary  inflammatory process or pleural effusion.      The liver demonstrates incidental simple cysts. The liver is otherwise  normal. The spleen is normal. There is no adrenal or pancreatic mass.  There is no renal mass, stone or obstruction. There is marked diffuse  edema with luminal narrowing involving the mid transverse colon, splenic  flexure, descending colon, sigmoid colon and rectum. There is no pelvic  mass or fluid. There is no ascites, aneurysm or retroperitoneal  lymphadenopathy. There is a small amount of fluid in both paracolic  gutters.       Impression:       1. There is marked edema involving a continuous segment of the colon  starting in the mid transverse colon, extending through the splenic  flexure into the descending colon, sigmoid colon and rectum.  2. The differential diagnosis would include ulcerative colitis or  ischemic colitis. In general, the patient does not appear to have  significant vascular disease.     D:  07/02/2018  E:  07/03/2018     This  report was finalized on 7/3/2018 9:15 AM by Dr. Bogdan Mariano MD.                             Order Current Status    Stool Culture - Stool, Per Rectum In process        Discharge Details        Discharge Medications      New Medications      Instructions Start Date   dicyclomine 10 MG capsule  Commonly known as:  BENTYL   10 mg, Oral, 4 Times Daily PRN         Continue These Medications      Instructions Start Date   lisinopril 5 MG tablet  Commonly known as:  PRINIVIL,ZESTRIL   5 mg, Oral, Daily      SIMVASTATIN PO   40 mg, Oral               Discharge Disposition:  Home or Self Care    Discharge Diet:     Dietary Orders     Start     Ordered    07/03/18 1356  Diet Regular; GI Soft/Ryan  Diet Effective Now     Question Answer Comment   Diet Texture / Consistency Regular    Common Modifiers GI Soft/Ryan        07/03/18 1356            Discharge Activity:   - as tolerated        Special Instructions:  - start ASA 81mg daily in one week    Code Status/Level of Support:  Code Status and Medical Interventions:   Ordered at: 07/02/18 2028     Code Status:    CPR     Medical Interventions (Level of Support Prior to Arrest):    Full       No future appointments.        Time Spent on Discharge:  31 minutes    Electronically signed by Deborah Kuhn DO, 07/04/18, 3:17 PM.

## 2018-07-06 LAB — BACTERIA SPEC AEROBE CULT: NORMAL

## 2019-01-07 RX ORDER — LISINOPRIL 5 MG/1
TABLET ORAL
Qty: 90 TABLET | Refills: 2 | Status: SHIPPED | OUTPATIENT
Start: 2019-01-07 | End: 2019-10-13 | Stop reason: SDUPTHER

## 2019-01-14 ENCOUNTER — TELEPHONE (OUTPATIENT)
Dept: INTERNAL MEDICINE | Facility: CLINIC | Age: 59
End: 2019-01-14

## 2019-01-14 RX ORDER — AMOXICILLIN AND CLAVULANATE POTASSIUM 500; 125 MG/1; MG/1
1 TABLET, FILM COATED ORAL 2 TIMES DAILY
Qty: 16 TABLET | Refills: 0 | Status: SHIPPED | OUTPATIENT
Start: 2019-01-14 | End: 2019-01-22

## 2019-01-14 NOTE — TELEPHONE ENCOUNTER
Reason for Call: SINUS INFECTION    Symptoms: PERSISTENT DRAINAGE IS HER ONLY SYMPTOM    Onset (when it began): 2 DAYS AGO    Have they tried anything? ZYRTEC ERASMO    Other pertinent info:    WANTS TO SEE IF DR MCCORD CAN CALL HER IN SOMETHING FOR THIS.  SHE GET THESE OFTEN AND HE USUALLY CALLS IN SOMETHING.

## 2019-01-14 NOTE — TELEPHONE ENCOUNTER
Symptoms noted if not allergic called in Augmentin 500 twice a day ×8 days and Zyrtec-D one daily 10 days.

## 2019-01-14 NOTE — TELEPHONE ENCOUNTER
See below.  I called patient.  Morning drainage has color to it.  Some sinus pressure. Denies fever.  Denies cough.

## 2019-03-13 PROBLEM — C81.90 HODGKIN'S DISEASE: Status: ACTIVE | Noted: 2019-03-13

## 2019-03-13 PROBLEM — Z85.71 HISTORY OF HODGKIN'S DISEASE: Status: ACTIVE | Noted: 2019-03-13

## 2019-03-13 PROBLEM — E55.9 VITAMIN D DEFICIENCY: Status: ACTIVE | Noted: 2019-03-13

## 2019-03-13 PROBLEM — J30.9 CHRONIC ALLERGIC RHINITIS: Status: ACTIVE | Noted: 2019-03-13

## 2019-03-13 PROBLEM — R79.89 ELEVATED PLATELET COUNT: Status: ACTIVE | Noted: 2019-03-13

## 2019-03-13 PROBLEM — L81.1 MELASMA: Status: ACTIVE | Noted: 2019-03-13

## 2019-03-13 PROBLEM — K55.9 ISCHEMIC COLITIS: Status: ACTIVE | Noted: 2019-03-13

## 2019-03-13 PROBLEM — H66.90 OTITIS: Status: ACTIVE | Noted: 2019-03-13

## 2019-03-21 ENCOUNTER — OFFICE VISIT (OUTPATIENT)
Dept: INTERNAL MEDICINE | Facility: CLINIC | Age: 59
End: 2019-03-21

## 2019-03-21 VITALS
HEART RATE: 88 BPM | SYSTOLIC BLOOD PRESSURE: 118 MMHG | WEIGHT: 161 LBS | BODY MASS INDEX: 29.63 KG/M2 | HEIGHT: 62 IN | DIASTOLIC BLOOD PRESSURE: 76 MMHG

## 2019-03-21 DIAGNOSIS — I10 ESSENTIAL HYPERTENSION: Primary | ICD-10-CM

## 2019-03-21 DIAGNOSIS — C81.98 HODGKIN LYMPHOMA OF LYMPH NODES OF MULTIPLE REGIONS, UNSPECIFIED HODGKIN LYMPHOMA TYPE (HCC): ICD-10-CM

## 2019-03-21 DIAGNOSIS — J30.9 CHRONIC ALLERGIC RHINITIS: ICD-10-CM

## 2019-03-21 DIAGNOSIS — E78.2 MIXED HYPERLIPIDEMIA: ICD-10-CM

## 2019-03-21 LAB
ALBUMIN SERPL-MCNC: 4.9 G/DL (ref 3.2–4.8)
ALBUMIN/GLOB SERPL: 2.5 G/DL (ref 1.5–2.5)
ALP SERPL-CCNC: 47 U/L (ref 25–100)
ALT SERPL W P-5'-P-CCNC: 33 U/L (ref 7–40)
ANION GAP SERPL CALCULATED.3IONS-SCNC: 4 MMOL/L (ref 3–11)
ARTICHOKE IGE QN: 98 MG/DL (ref 0–130)
AST SERPL-CCNC: 31 U/L (ref 0–33)
BASOPHILS # BLD AUTO: 0.03 10*3/MM3 (ref 0–0.2)
BASOPHILS NFR BLD AUTO: 0.6 % (ref 0–1)
BILIRUB SERPL-MCNC: 1.2 MG/DL (ref 0.3–1.2)
BUN BLD-MCNC: 20 MG/DL (ref 9–23)
BUN/CREAT SERPL: 22.5 (ref 7–25)
CALCIUM SPEC-SCNC: 9.8 MG/DL (ref 8.7–10.4)
CHLORIDE SERPL-SCNC: 105 MMOL/L (ref 99–109)
CHOLEST SERPL-MCNC: 161 MG/DL (ref 0–200)
CO2 SERPL-SCNC: 30 MMOL/L (ref 20–31)
CREAT BLD-MCNC: 0.89 MG/DL (ref 0.6–1.3)
DEPRECATED RDW RBC AUTO: 43 FL (ref 37–54)
EOSINOPHIL # BLD AUTO: 0.13 10*3/MM3 (ref 0–0.3)
EOSINOPHIL NFR BLD AUTO: 2.5 % (ref 0–3)
ERYTHROCYTE [DISTWIDTH] IN BLOOD BY AUTOMATED COUNT: 12.9 % (ref 11.3–14.5)
GFR SERPL CREATININE-BSD FRML MDRD: 65 ML/MIN/1.73
GLOBULIN UR ELPH-MCNC: 2 GM/DL
GLUCOSE BLD-MCNC: 96 MG/DL (ref 70–100)
HCT VFR BLD AUTO: 44.2 % (ref 34.5–44)
HDLC SERPL-MCNC: 53 MG/DL (ref 40–60)
HGB BLD-MCNC: 14.2 G/DL (ref 11.5–15.5)
IMM GRANULOCYTES # BLD AUTO: 0.01 10*3/MM3 (ref 0–0.05)
IMM GRANULOCYTES NFR BLD AUTO: 0.2 % (ref 0–0.6)
LYMPHOCYTES # BLD AUTO: 1.37 10*3/MM3 (ref 0.6–4.8)
LYMPHOCYTES NFR BLD AUTO: 26.4 % (ref 24–44)
MCH RBC QN AUTO: 29.4 PG (ref 27–31)
MCHC RBC AUTO-ENTMCNC: 32.1 G/DL (ref 32–36)
MCV RBC AUTO: 91.5 FL (ref 80–99)
MONOCYTES # BLD AUTO: 0.5 10*3/MM3 (ref 0–1)
MONOCYTES NFR BLD AUTO: 9.7 % (ref 0–12)
NEUTROPHILS # BLD AUTO: 3.15 10*3/MM3 (ref 1.5–8.3)
NEUTROPHILS NFR BLD AUTO: 60.8 % (ref 41–71)
PLATELET # BLD AUTO: 320 10*3/MM3 (ref 150–450)
PMV BLD AUTO: 9.1 FL (ref 6–12)
POTASSIUM BLD-SCNC: 3.7 MMOL/L (ref 3.5–5.5)
PROT SERPL-MCNC: 6.9 G/DL (ref 5.7–8.2)
RBC # BLD AUTO: 4.83 10*6/MM3 (ref 3.89–5.14)
SODIUM BLD-SCNC: 139 MMOL/L (ref 132–146)
TRIGL SERPL-MCNC: 63 MG/DL (ref 0–150)
TSH SERPL DL<=0.05 MIU/L-ACNC: 2.57 MIU/ML (ref 0.35–5.35)
WBC NRBC COR # BLD: 5.18 10*3/MM3 (ref 3.5–10.8)

## 2019-03-21 PROCEDURE — 84443 ASSAY THYROID STIM HORMONE: CPT | Performed by: INTERNAL MEDICINE

## 2019-03-21 PROCEDURE — 80053 COMPREHEN METABOLIC PANEL: CPT | Performed by: INTERNAL MEDICINE

## 2019-03-21 PROCEDURE — 80061 LIPID PANEL: CPT | Performed by: INTERNAL MEDICINE

## 2019-03-21 PROCEDURE — 85025 COMPLETE CBC W/AUTO DIFF WBC: CPT | Performed by: INTERNAL MEDICINE

## 2019-03-21 PROCEDURE — 99396 PREV VISIT EST AGE 40-64: CPT | Performed by: INTERNAL MEDICINE

## 2019-03-21 PROCEDURE — 36415 COLL VENOUS BLD VENIPUNCTURE: CPT | Performed by: INTERNAL MEDICINE

## 2019-03-21 RX ORDER — ERGOCALCIFEROL (VITAMIN D2) 50 MCG
2 CAPSULE ORAL WEEKLY
COMMUNITY
End: 2023-01-04

## 2019-03-21 NOTE — PROGRESS NOTES
[facility]     Hiwot Ren  1960   [id number]      Patient Care Team:  Kimani Adams MD as PCP - General (Internal Medicine)    Chief Complaint::   Chief Complaint   Patient presents with   • Annual Exam   Essential hypertension  Mixed hyperlipidemia  Overweight  Chronic allergy  Hodgkin's lymphoma diagnosed in 1990 treated in 1991        HPI  Patient is a 58-year-old female in general good health with a past history of essential hypertension on lisinopril 5 mg a history of mixed hyperlipidemia on simvastatin 40 mg a history of chronic allergy history of overweight a history of non-Hodgkin's lymphoma diagnosed in 1990 above and below the diaphragm treated with chemotherapy therapy ended 1991 with no evidence of recurrence.    Chronic Conditions: Chronic problems of hypertension and mixed hyperlipidemia are addressed no changes in diet medication or activity    Patient Active Problem List   Diagnosis   • Colitis   • Essential hypertension   • Hyperlipidemia   • Dehydration   • Leukocytosis   • Lower GI bleed   • Chronic allergic rhinitis   • Vitamin D deficiency   • Otitis   • History of Hodgkin's disease   • Hodgkin's disease (CMS/HCC)   • Ischemic colitis (CMS/HCC)   • Melasma   • Elevated platelet count        Past Medical History:   Diagnosis Date   • Colitis 07/02/2015    MED RX 07/04/2018   • Drug therapy    • Hodgkin's lymphoma (CMS/HCC) 1991    chemo-25 yrs ago    • Hyperlipidemia    • Hypertension        Past Surgical History:   Procedure Laterality Date   • BREAST BIOPSY     • BREAST CYST EXCISION         Family History   Problem Relation Age of Onset   • Breast cancer Maternal Aunt 18   • Lung cancer Mother    • Heart disease Father    • Parkinsonism Father    • Diabetes Father    • Alzheimer's disease Father        Social History     Socioeconomic History   • Marital status:      Spouse name: Not on file   • Number of children: Not on file   • Years of education: Not on  "file   • Highest education level: Not on file   Tobacco Use   • Smoking status: Never Smoker   • Smokeless tobacco: Never Used   Substance and Sexual Activity   • Alcohol use: No   • Drug use: No       No Known Allergies    Immunization History   Administered Date(s) Administered   • Tdap 01/27/2016        Health Maintenance Due   Topic Date Due   • PNEUMOCOCCAL VACCINE (19-64 HIGHEST RISK) (1 of 3 - PCV13) 12/18/1979   • ZOSTER VACCINE (1 of 2) 12/18/2010   • INFLUENZA VACCINE  08/01/2018   • HEPATITIS C SCREENING  01/14/2019   • PAP SMEAR  01/14/2019   • LIPID PANEL  01/14/2019   • ANNUAL PHYSICAL  02/09/2019        Review of Systems     HEENT: Denies headache dizziness ear nose mouth or throat trouble other than mild allergy  NECK: Denies dysphagia stiffness or pain  CHEST: Denies cough or wheeze  CARDIAC: Denies chest pain pressure denies palpitations  ABD: Denies nausea vomiting has had some problems with irritable bowel and colitis currently stable on no medicines  : Denies dysuria frequency  NEURO: Denies syncope concussion or neuropathy  PSYCH: Denies anxiety or significant depression  EXTREM: Has some mild arthritic soreness particularly of the knees denies edema    Vital Signs  Vitals:    03/21/19 0855   BP: 118/76   BP Location: Left arm   Patient Position: Sitting   Pulse: 88   Weight: 73 kg (161 lb)   Height: 157.5 cm (62\")   PainSc: 0-No pain         Current Outpatient Medications:   •  lisinopril (PRINIVIL,ZESTRIL) 5 MG tablet, TAKE 1 TABLET BY MOUTH EVERY DAY, Disp: 90 tablet, Rfl: 2  •  Multiple Vitamins-Minerals (MULTIVITAMIN ADULT PO), Take 1 tablet by mouth Daily., Disp: , Rfl:   •  SIMVASTATIN PO, Take 40 mg by mouth., Disp: , Rfl:   •  Vitamin D, Ergocalciferol, 2000 units capsule, Take 2 capsules by mouth Daily., Disp: , Rfl:   •  dicyclomine (BENTYL) 10 MG capsule, Take 1 capsule by mouth 4 (Four) Times a Day As Needed (abdominal cramping)., Disp: 20 capsule, Rfl: 0    Physical Exam "   HEENT: Pupils equal reactive ENT clear no facial symmetry pharynx is clear  NECK: Neck without masses thyromegaly bruit or neck vein distention  CHEST: Clear to P&A without rales wheezes or rhonchi  CARDIAC: Cardiac sounds or gallop rub click or murmur  ABD: Abdomen is flat supple tenderness or masses liver and spleen are normal positive bowel sounds  :   NEURO: CNS is intact with no neuropathy  PSYCH: No evidence of anxiety depression  EXTREM: Minimal arthritic change primarily in the low back hips and knees no edema  Skin: Clear     Results Review:    CBC CMP lipid and TSH are pending EKG not done  Procedures    Medication Review: Medications reviewed and noted    Patient wellness counseling  Exercise: Patient is physically active she does not have a routine exercise program she does work at [employer] in the [place] and stays physically fit  Diet: Patient needs a healthy cardiac diet with reduce carbs and reduce portions for weight loss as her BMI is 29.5  Smoking: Non-smoker  Alcohol: None alcohol  Screening: Patient has had recent Pap smear and her mammogram is due she will be getting in the next 2 monthsPatient Wellness Counseling:   Plan of care reviewed with patient at the conclusion of today's visit. Education was provided in regards to diagnosis, diet and exercise, cervical cancer screening, self breast exams, breast cancer screening, and the importance of yearly mammograms.   Nutrition, family planning/contraception, physical activity, healthy weight,ways to reduce stress, adequate sleep, injury prevention, misuse of tobacco, alcohol and drugs, sexual behavior and STD's, dental health, mental health, and immunizations.    Management and any prescribed or recommended OTC medications.  Patient verbalizes understanding of and agreement with management plan.      Assessment/Plan: #1 essential hypertension patient is on lisinopril 5 mg daily denies headache or cough blood pressure in good control no change  in therapy fasting lab is pending.  #2 mixed hyperlipidemia on simvastatin 40 mg denies myalgia fasting lab is pending no change in therapy  #3 overweight BMI 29.5 suggest diet reduce carbs and smaller portions along with exercise to work on weight loss  #4 non-Hodgkin's lymphoma history of treated and diagnosed in 1990 and 1991 with no evidence of recurrence  #5 chronic allergy  #6 history of colitis and irritable bowel currently stable on no therapy    Plan no EKG  Fasting lab is pending  Continue current medications  Return visit 6 months or as needed  Patient Instructions   Fasting labs pending notify results  Continue all current medications  No EKG was done this visit  Return visit in 6 months or as needed  Suggest careful cardiac healthy diet with low carbs and smaller portions and walking physical exercise.       CMP:  Lab Results   Component Value Date    BUN 20 03/21/2019    CREATININE 0.89 03/21/2019    EGFRIFNONA 65 03/21/2019    BCR 22.5 03/21/2019     03/21/2019    K 3.7 03/21/2019    CO2 30.0 03/21/2019    CALCIUM 9.8 03/21/2019    ALBUMIN 4.90 (H) 03/21/2019    BILITOT 1.2 03/21/2019    ALKPHOS 47 03/21/2019    AST 31 03/21/2019    ALT 33 03/21/2019     HbA1c:  No results found for: HGBA1C  Microalbumin:  No results found for: MICROALBUR, POCMALB, POCCREAT  Lipid Panel  Lab Results   Component Value Date    CHOL 161 03/21/2019    TRIG 63 03/21/2019    HDL 53 03/21/2019    LDL 98 03/21/2019    AST 31 03/21/2019    ALT 33 03/21/2019        Counseling was given to patient for the following topics: healthy eating habits.    Plan of care reviewed with patient at the conclusion of today's visit. Education was provided regarding diagnosis, management, and any prescribed or recommended OTC medications.Patient verbalizes understanding of and agreement with management plan.         Kimani Adams MD    Note: Speech recognition transcription software was used to dictate portions of this document.  An  attempt at proofreading has been made though minor errors in transcription may still be present.  Please do not hesitate to call our office with any questions.

## 2019-03-21 NOTE — PATIENT INSTRUCTIONS
Fasting labs pending notify results  Continue all current medications  No EKG was done this visit  Return visit in 6 months or as needed  Suggest careful cardiac healthy diet with low carbs and smaller portions and walking physical exercise.

## 2019-04-22 RX ORDER — SIMVASTATIN 40 MG
TABLET ORAL
Qty: 90 TABLET | Refills: 3 | Status: SHIPPED | OUTPATIENT
Start: 2019-04-22 | End: 2020-04-20

## 2019-08-29 ENCOUNTER — PATIENT MESSAGE (OUTPATIENT)
Dept: INTERNAL MEDICINE | Facility: CLINIC | Age: 59
End: 2019-08-29

## 2019-08-29 ENCOUNTER — TELEPHONE (OUTPATIENT)
Dept: INTERNAL MEDICINE | Facility: CLINIC | Age: 59
End: 2019-08-29

## 2019-08-29 RX ORDER — BENZONATATE 200 MG/1
200 CAPSULE ORAL 3 TIMES DAILY PRN
Qty: 30 CAPSULE | Refills: 0 | Status: SHIPPED | OUTPATIENT
Start: 2019-08-29 | End: 2019-09-08

## 2019-08-29 RX ORDER — MOMETASONE FUROATE 50 UG/1
2 SPRAY, METERED NASAL DAILY
Qty: 17 G | Refills: 0 | Status: SHIPPED | OUTPATIENT
Start: 2019-08-29 | End: 2019-09-28

## 2019-08-29 NOTE — TELEPHONE ENCOUNTER
From: Linda Reeder MA  To: Hiwot Ren  Sent: 8/29/2019 1:17 PM EDT    Ms. Ren,    Dr. Adams is out of the office today and tomorrow. Miguel Banda has sent in some tessalon pearls and a nasal spray for you. He suggested that you be seen to determine the best course of treatment. These symptoms have not been resolved by antibiotics. Please let us know if you need anything else.

## 2019-09-03 RX ORDER — AMOXICILLIN AND CLAVULANATE POTASSIUM 500; 125 MG/1; MG/1
1 TABLET, FILM COATED ORAL 2 TIMES DAILY
Qty: 16 TABLET | Refills: 1 | Status: SHIPPED | OUTPATIENT
Start: 2019-09-03 | End: 2019-10-07

## 2019-10-07 ENCOUNTER — LAB (OUTPATIENT)
Dept: LAB | Facility: HOSPITAL | Age: 59
End: 2019-10-07

## 2019-10-07 ENCOUNTER — OFFICE VISIT (OUTPATIENT)
Dept: INTERNAL MEDICINE | Facility: CLINIC | Age: 59
End: 2019-10-07

## 2019-10-07 VITALS
HEART RATE: 84 BPM | WEIGHT: 158 LBS | BODY MASS INDEX: 29.08 KG/M2 | DIASTOLIC BLOOD PRESSURE: 84 MMHG | HEIGHT: 62 IN | SYSTOLIC BLOOD PRESSURE: 138 MMHG

## 2019-10-07 DIAGNOSIS — I10 ESSENTIAL HYPERTENSION: ICD-10-CM

## 2019-10-07 DIAGNOSIS — E78.2 MIXED HYPERLIPIDEMIA: ICD-10-CM

## 2019-10-07 DIAGNOSIS — I10 ESSENTIAL HYPERTENSION: Primary | ICD-10-CM

## 2019-10-07 DIAGNOSIS — E55.9 VITAMIN D DEFICIENCY: ICD-10-CM

## 2019-10-07 LAB
ALBUMIN SERPL-MCNC: 5.1 G/DL (ref 3.5–5.2)
ALBUMIN/GLOB SERPL: 2 G/DL
ALP SERPL-CCNC: 43 U/L (ref 39–117)
ALT SERPL W P-5'-P-CCNC: 21 U/L (ref 1–33)
ANION GAP SERPL CALCULATED.3IONS-SCNC: 11.2 MMOL/L (ref 5–15)
AST SERPL-CCNC: 22 U/L (ref 1–32)
BILIRUB SERPL-MCNC: 0.7 MG/DL (ref 0.2–1.2)
BUN BLD-MCNC: 17 MG/DL (ref 6–20)
BUN/CREAT SERPL: 19.3 (ref 7–25)
CALCIUM SPEC-SCNC: 10.3 MG/DL (ref 8.6–10.5)
CHLORIDE SERPL-SCNC: 98 MMOL/L (ref 98–107)
CHOLEST SERPL-MCNC: 171 MG/DL (ref 0–200)
CO2 SERPL-SCNC: 30.8 MMOL/L (ref 22–29)
CREAT BLD-MCNC: 0.88 MG/DL (ref 0.57–1)
GFR SERPL CREATININE-BSD FRML MDRD: 66 ML/MIN/1.73
GLOBULIN UR ELPH-MCNC: 2.5 GM/DL
GLUCOSE BLD-MCNC: 99 MG/DL (ref 65–99)
HDLC SERPL-MCNC: 52 MG/DL (ref 40–60)
LDLC SERPL CALC-MCNC: 96 MG/DL (ref 0–100)
LDLC/HDLC SERPL: 1.85 {RATIO}
POTASSIUM BLD-SCNC: 4.4 MMOL/L (ref 3.5–5.2)
PROT SERPL-MCNC: 7.6 G/DL (ref 6–8.5)
SODIUM BLD-SCNC: 140 MMOL/L (ref 136–145)
TRIGL SERPL-MCNC: 113 MG/DL (ref 0–150)
VLDLC SERPL-MCNC: 22.6 MG/DL (ref 5–40)

## 2019-10-07 PROCEDURE — 80053 COMPREHEN METABOLIC PANEL: CPT

## 2019-10-07 PROCEDURE — 99214 OFFICE O/P EST MOD 30 MIN: CPT | Performed by: INTERNAL MEDICINE

## 2019-10-07 PROCEDURE — 80061 LIPID PANEL: CPT

## 2019-10-07 NOTE — PROGRESS NOTES
[facility]     Hiwot Ren  1960   [id number]      Patient Care Team:  Kimani Adams MD as PCP - General (Internal Medicine)    Chief Complaint::   Chief Complaint   Patient presents with   • Hypertension   • Hyperlipidemia            HPI  Patient is a 58-year-old female with history of essential hypertension and mixed hyperlipidemia on lisinopril 5 mg daily and simvastatin 40 mg daily with a history of Hodgkin's disease currently stable denies cough wheeze chest pain pressure nausea vomiting denies weight loss fever chills or adenopathy      Patient Active Problem List   Diagnosis   • Colitis   • Essential hypertension   • Hyperlipidemia   • Dehydration   • Leukocytosis   • Lower GI bleed   • Chronic allergic rhinitis   • Vitamin D deficiency   • Otitis   • History of Hodgkin's disease   • Hodgkin's disease (CMS/HCC)   • Ischemic colitis (CMS/HCC)   • Melasma   • Elevated platelet count        Past Medical History:   Diagnosis Date   • Colitis 07/02/2015    MED RX 07/04/2018   • Drug therapy    • Hodgkin's lymphoma (CMS/HCC) 1991    chemo-25 yrs ago    • Hyperlipidemia    • Hypertension        Past Surgical History:   Procedure Laterality Date   • BREAST BIOPSY     • BREAST CYST EXCISION         Family History   Problem Relation Age of Onset   • Breast cancer Maternal Aunt 18   • Lung cancer Mother    • Heart disease Father    • Parkinsonism Father    • Diabetes Father    • Alzheimer's disease Father        Social History     Socioeconomic History   • Marital status:      Spouse name: Not on file   • Number of children: Not on file   • Years of education: Not on file   • Highest education level: Not on file   Tobacco Use   • Smoking status: Never Smoker   • Smokeless tobacco: Never Used   Substance and Sexual Activity   • Alcohol use: No   • Drug use: No       No Known Allergies    Review of Systems     HEENT: Denies headache or dizzy has mild allergy  NECK: Denies dysphasia or  "reflux  CHEST: No cough or wheeze non-smoker  CARDIAC: Denies chest pain or pressure  ABD: No nausea vomiting  : Denies dysuria frequency  NEURO: Denies syncope concussion or neuropathy  PSYCH: Denies anxiety  EXTREM: Denies arthritic changes or edema    Vital Signs  Vitals:    10/07/19 0858   BP: 138/84   BP Location: Right arm   Patient Position: Sitting   Cuff Size: Adult   Pulse: 84   Weight: 71.7 kg (158 lb)   Height: 157.5 cm (62\")   PainSc: 0-No pain     Body mass index is 28.9 kg/m².    Current Outpatient Medications:   •  lisinopril (PRINIVIL,ZESTRIL) 5 MG tablet, TAKE 1 TABLET BY MOUTH EVERY DAY, Disp: 90 tablet, Rfl: 2  •  Multiple Vitamins-Minerals (MULTIVITAMIN ADULT PO), Take 1 tablet by mouth Daily., Disp: , Rfl:   •  simvastatin (ZOCOR) 40 MG tablet, TAKE 1 TABLET BY MOUTH EVERY EVENING, Disp: 90 tablet, Rfl: 3  •  Vitamin D, Ergocalciferol, 2000 units capsule, Take 2 capsules by mouth Daily., Disp: , Rfl:     Physical Exam     ACE III MINI         HEENT: Pupils equal reactive ENT clear no asymmetry  NECK: No masses bruits or thyromegaly  CHEST: Clear  CARDIAC: Regular rhythm without gallop or rub  ABD: Liver and spleen are normal positive bowel sounds no bruits  :   NEURO: CNS is intact no neuropathy  PSYCH: Normal  EXTREM: Normal  Skin: Clear     Results Review:    No results found for this or any previous visit (from the past 672 hour(s)).  Procedures    Medication Review: Medications reviewed and noted    Patient wellness counseling  Exercise: Encouraged walking exercise  Diet: Encouraged healthy cardiac diet with reduced carbs smaller portions and weight loss BMI is 28.9  Smoking: Non-smoker  Alcohol: None alcohol  Screening: CMP and lipid are pending    Assessment/Plan:    Problem List Items Addressed This Visit        Cardiovascular and Mediastinum    Essential hypertension - Primary    Current Assessment & Plan     Patient is on lisinopril 5 mg daily her current blood pressure is 138/84 " repeated at 132/82 left and right sitting no change therapy no headache or cough.         Relevant Medications    lisinopril (PRINIVIL,ZESTRIL) 5 MG tablet    Other Relevant Orders    Comprehensive Metabolic Panel    Hyperlipidemia    Overview     Mixed         Current Assessment & Plan     History of mixed hyperlipidemia on simvastatin 40 mg denies myalgia or arthralgia fasting lab of CMP and lipid are pending no change therapy         Relevant Medications    simvastatin (ZOCOR) 40 MG tablet    Other Relevant Orders    Lipid Panel       Digestive    Vitamin D deficiency    Current Assessment & Plan     History of vitamin D deficiency currently on 2000 units 2 capsules/day for a total of 4000 units continue current therapy                Patient Instructions   Fasting CMP and lipid are pending  Continue current medication and therapy  Encouraged walking exercise  Encouraged low-carb healthy cardiac diet  Return visit in 6 months or as needed       Plan of care reviewed with patient at the conclusion of today's visit. Education was provided regarding diagnosis, management, and any prescribed or recommended OTC medications.Patient verbalizes understanding of and agreement with management plan.         Kimani Adams MD      Note: Part of this note may be an electronic transcription/translation of spoken language to printed text using the Dragon Dictation system.

## 2019-10-07 NOTE — ASSESSMENT & PLAN NOTE
History of vitamin D deficiency currently on 2000 units 2 capsules/day for a total of 4000 units continue current therapy

## 2019-10-07 NOTE — ASSESSMENT & PLAN NOTE
History of mixed hyperlipidemia on simvastatin 40 mg denies myalgia or arthralgia fasting lab of CMP and lipid are pending no change therapy

## 2019-10-07 NOTE — PATIENT INSTRUCTIONS
Fasting CMP and lipid are pending  Continue current medication and therapy  Encouraged walking exercise  Encouraged low-carb healthy cardiac diet  Return visit in 6 months or as needed

## 2019-10-07 NOTE — ASSESSMENT & PLAN NOTE
Patient is on lisinopril 5 mg daily her current blood pressure is 138/84 repeated at 132/82 left and right sitting no change therapy no headache or cough.

## 2019-10-14 RX ORDER — LISINOPRIL 5 MG/1
TABLET ORAL
Qty: 90 TABLET | Refills: 2 | Status: SHIPPED | OUTPATIENT
Start: 2019-10-14 | End: 2020-07-17

## 2020-03-21 ENCOUNTER — PATIENT MESSAGE (OUTPATIENT)
Dept: INTERNAL MEDICINE | Facility: CLINIC | Age: 60
End: 2020-03-21

## 2020-03-23 ENCOUNTER — TELEPHONE (OUTPATIENT)
Dept: INTERNAL MEDICINE | Facility: CLINIC | Age: 60
End: 2020-03-23

## 2020-03-23 NOTE — TELEPHONE ENCOUNTER
Regarding: Non-Urgent Medical Question  Contact: [phone]  ----- Message from Linda Reeder MA sent at 3/23/2020  8:15 AM EDT -----       ----- Message from Hiwot Ren to Kimani Adams MD sent at 3/21/2020  2:43 PM -----   Can I request a letter from dr Adams to my work place to take time off because of Kimani’s kidney disease.  I don’t want to take a chance of getting it.      Thank you  Hiwot      To whom it may concern    Regards Hiwot Ren,                 NEIDA 1960    This 59-year-old female patient in our office should be excused from work, as her spouse has chronic kidney disease, and any exposures to theCovid 19 virus would be detrimental to her spouse's current illness.    If there are any questions please contact this physician .    Sincerely  Kimani Adams MD, FACP

## 2020-03-23 NOTE — TELEPHONE ENCOUNTER
From: Hiwot Ren  To: Kimani Adams MD  Sent: 3/21/2020 2:43 PM EDT  Subject: Non-Urgent Medical Question    Can I request a letter from dr Adams to my work place to take time off because of Kimani’s kidney disease. I don’t want to take a chance of getting it.     Thank you  Hiwot

## 2020-04-08 ENCOUNTER — TELEPHONE (OUTPATIENT)
Dept: INTERNAL MEDICINE | Facility: CLINIC | Age: 60
End: 2020-04-08

## 2020-04-08 NOTE — TELEPHONE ENCOUNTER
Regards Hiwot Ren  Date of birth 1960    To whom it may concern    This 59-year-old patient has a history of Hodgkin's lymphoma, and is immunosuppressed from previous therapy, and has a history of essential hypertension.  The patient does not have a respiratory infection has had no known exposure to respiratory illnesses and may return to work on April 24    Sincerely  Kimani Adams MD, FACP

## 2020-04-08 NOTE — TELEPHONE ENCOUNTER
The note below was addended because it needed to have a specific return to work date.  Letter printed and sent to front for patient to .

## 2020-04-20 RX ORDER — SIMVASTATIN 40 MG
TABLET ORAL
Qty: 90 TABLET | Refills: 3 | Status: SHIPPED | OUTPATIENT
Start: 2020-04-20 | End: 2021-04-19

## 2020-06-09 ENCOUNTER — TELEPHONE (OUTPATIENT)
Dept: INTERNAL MEDICINE | Facility: CLINIC | Age: 60
End: 2020-06-09

## 2020-06-09 DIAGNOSIS — E78.2 MIXED HYPERLIPIDEMIA: Primary | ICD-10-CM

## 2020-06-09 DIAGNOSIS — R79.89 ELEVATED PLATELET COUNT: ICD-10-CM

## 2020-06-09 NOTE — TELEPHONE ENCOUNTER
PT MOVED HER PHYSICAL TO AUGUST 3 AND WOULD LIKE LABS PRIOR TO APPT; PLEASE ADVISE    PING: [phone]

## 2020-06-10 NOTE — TELEPHONE ENCOUNTER
Patient notified via voice mail message.  Explained lab procedures.  Orders entered and sent to  for sign-off

## 2020-07-17 RX ORDER — LISINOPRIL 5 MG/1
TABLET ORAL
Qty: 90 TABLET | Refills: 0 | Status: SHIPPED | OUTPATIENT
Start: 2020-07-17 | End: 2020-10-16

## 2020-07-30 ENCOUNTER — LAB (OUTPATIENT)
Dept: LAB | Facility: HOSPITAL | Age: 60
End: 2020-07-30

## 2020-07-30 DIAGNOSIS — E78.2 MIXED HYPERLIPIDEMIA: ICD-10-CM

## 2020-07-30 DIAGNOSIS — R79.89 ELEVATED PLATELET COUNT: ICD-10-CM

## 2020-07-30 LAB
ALBUMIN SERPL-MCNC: 4.7 G/DL (ref 3.5–5.2)
ALBUMIN/GLOB SERPL: 2 G/DL
ALP SERPL-CCNC: 36 U/L (ref 39–117)
ALT SERPL W P-5'-P-CCNC: 27 U/L (ref 1–33)
ANION GAP SERPL CALCULATED.3IONS-SCNC: 12.3 MMOL/L (ref 5–15)
AST SERPL-CCNC: 24 U/L (ref 1–32)
BASOPHILS # BLD AUTO: 0.04 10*3/MM3 (ref 0–0.2)
BASOPHILS NFR BLD AUTO: 0.9 % (ref 0–1.5)
BILIRUB SERPL-MCNC: 0.8 MG/DL (ref 0–1.2)
BUN SERPL-MCNC: 17 MG/DL (ref 6–20)
BUN/CREAT SERPL: 21.8 (ref 7–25)
CALCIUM SPEC-SCNC: 10.4 MG/DL (ref 8.6–10.5)
CHLORIDE SERPL-SCNC: 100 MMOL/L (ref 98–107)
CHOLEST SERPL-MCNC: 150 MG/DL (ref 0–200)
CO2 SERPL-SCNC: 25.7 MMOL/L (ref 22–29)
CREAT SERPL-MCNC: 0.78 MG/DL (ref 0.57–1)
DEPRECATED RDW RBC AUTO: 41.5 FL (ref 37–54)
EOSINOPHIL # BLD AUTO: 0.08 10*3/MM3 (ref 0–0.4)
EOSINOPHIL NFR BLD AUTO: 1.9 % (ref 0.3–6.2)
ERYTHROCYTE [DISTWIDTH] IN BLOOD BY AUTOMATED COUNT: 12.6 % (ref 12.3–15.4)
GFR SERPL CREATININE-BSD FRML MDRD: 76 ML/MIN/1.73
GLOBULIN UR ELPH-MCNC: 2.3 GM/DL
GLUCOSE SERPL-MCNC: 89 MG/DL (ref 65–99)
HCT VFR BLD AUTO: 44.3 % (ref 34–46.6)
HDLC SERPL-MCNC: 54 MG/DL (ref 40–60)
HGB BLD-MCNC: 15 G/DL (ref 12–15.9)
IMM GRANULOCYTES # BLD AUTO: 0.01 10*3/MM3 (ref 0–0.05)
IMM GRANULOCYTES NFR BLD AUTO: 0.2 % (ref 0–0.5)
LDLC SERPL CALC-MCNC: 77 MG/DL (ref 0–100)
LDLC/HDLC SERPL: 1.43 {RATIO}
LYMPHOCYTES # BLD AUTO: 1.25 10*3/MM3 (ref 0.7–3.1)
LYMPHOCYTES NFR BLD AUTO: 29.6 % (ref 19.6–45.3)
MCH RBC QN AUTO: 30 PG (ref 26.6–33)
MCHC RBC AUTO-ENTMCNC: 33.9 G/DL (ref 31.5–35.7)
MCV RBC AUTO: 88.6 FL (ref 79–97)
MONOCYTES # BLD AUTO: 0.39 10*3/MM3 (ref 0.1–0.9)
MONOCYTES NFR BLD AUTO: 9.2 % (ref 5–12)
NEUTROPHILS NFR BLD AUTO: 2.46 10*3/MM3 (ref 1.7–7)
NEUTROPHILS NFR BLD AUTO: 58.2 % (ref 42.7–76)
NRBC BLD AUTO-RTO: 0 /100 WBC (ref 0–0.2)
PLATELET # BLD AUTO: 332 10*3/MM3 (ref 140–450)
PMV BLD AUTO: 9.4 FL (ref 6–12)
POTASSIUM SERPL-SCNC: 4.2 MMOL/L (ref 3.5–5.2)
PROT SERPL-MCNC: 7 G/DL (ref 6–8.5)
RBC # BLD AUTO: 5 10*6/MM3 (ref 3.77–5.28)
SODIUM SERPL-SCNC: 138 MMOL/L (ref 136–145)
TRIGL SERPL-MCNC: 94 MG/DL (ref 0–150)
TSH SERPL DL<=0.05 MIU/L-ACNC: 2.23 UIU/ML (ref 0.27–4.2)
VLDLC SERPL-MCNC: 18.8 MG/DL (ref 5–40)
WBC # BLD AUTO: 4.23 10*3/MM3 (ref 3.4–10.8)

## 2020-07-30 PROCEDURE — 85025 COMPLETE CBC W/AUTO DIFF WBC: CPT

## 2020-07-30 PROCEDURE — 80061 LIPID PANEL: CPT

## 2020-07-30 PROCEDURE — 80053 COMPREHEN METABOLIC PANEL: CPT

## 2020-07-30 PROCEDURE — 84443 ASSAY THYROID STIM HORMONE: CPT

## 2020-08-03 ENCOUNTER — OFFICE VISIT (OUTPATIENT)
Dept: INTERNAL MEDICINE | Facility: CLINIC | Age: 60
End: 2020-08-03

## 2020-08-03 VITALS
TEMPERATURE: 96.4 F | BODY MASS INDEX: 28.82 KG/M2 | WEIGHT: 156.6 LBS | HEART RATE: 88 BPM | HEIGHT: 62 IN | DIASTOLIC BLOOD PRESSURE: 84 MMHG | SYSTOLIC BLOOD PRESSURE: 114 MMHG

## 2020-08-03 DIAGNOSIS — I10 ESSENTIAL HYPERTENSION: Primary | ICD-10-CM

## 2020-08-03 DIAGNOSIS — E78.2 MIXED HYPERLIPIDEMIA: ICD-10-CM

## 2020-08-03 DIAGNOSIS — C81.98 HODGKIN LYMPHOMA OF LYMPH NODES OF MULTIPLE REGIONS, UNSPECIFIED HODGKIN LYMPHOMA TYPE (HCC): ICD-10-CM

## 2020-08-03 DIAGNOSIS — E55.9 VITAMIN D DEFICIENCY: ICD-10-CM

## 2020-08-03 PROCEDURE — 93000 ELECTROCARDIOGRAM COMPLETE: CPT | Performed by: INTERNAL MEDICINE

## 2020-08-03 PROCEDURE — 99396 PREV VISIT EST AGE 40-64: CPT | Performed by: INTERNAL MEDICINE

## 2020-08-03 RX ORDER — FLUTICASONE PROPIONATE 50 MCG
2 SPRAY, SUSPENSION (ML) NASAL DAILY
COMMUNITY
End: 2023-01-04

## 2020-08-03 NOTE — ASSESSMENT & PLAN NOTE
Mixed hyperlipidemia treated with simvastatin 40 mg daily recent lab cholesterol 150, LDL 77 continue simvastatin 40

## 2020-08-03 NOTE — PATIENT INSTRUCTIONS
Lab of July 30 discussed  EKG of this day discussed  Continue all current medications  Encouraged walking exercise and healthy cardiac diet  Return visit in 6 months or as needed

## 2020-08-03 NOTE — PROGRESS NOTES
[facility]     Hiwot Ren  1960   [id number]      Patient Care Team:  Kimani Adams MD as PCP - General (Internal Medicine)    Chief Complaint::   Chief Complaint   Patient presents with   • Annual Exam            HPI  Patient 59-year-old female  at [employer] in [place] with a history of essential hypertension on lisinopril 5 mixed hyperlipidemia on simvastatin 40 vitamin D deficiency chronic allergy on Flonase as needed with a history of Hodgkin's disease treated with chemotherapy 1990 with no evidence of residual overall feeling well denying headache or dizzy does have chronic sinus Flonase is beneficial denies dysphasia non-smoker denies cough or wheeze denies chest pain pressure palpitations denies nausea vomiting denies extremity pain or soreness or edema, the patient does not smoke does not drink alcohol does not have a living well.      Patient Active Problem List   Diagnosis   • Colitis   • Essential hypertension   • Hyperlipidemia   • Dehydration   • Leukocytosis   • Lower GI bleed   • Chronic allergic rhinitis   • Vitamin D deficiency   • Otitis   • History of Hodgkin's disease   • Hodgkin's disease (CMS/HCC)   • Ischemic colitis (CMS/HCC)   • Melasma   • Elevated platelet count        Past Medical History:   Diagnosis Date   • Colitis 07/02/2015    MED RX 07/04/2018   • Drug therapy    • Hodgkin's lymphoma (CMS/HCC) 1991    chemo-25 yrs ago    • Hyperlipidemia    • Hypertension        Past Surgical History:   Procedure Laterality Date   • BREAST BIOPSY     • BREAST CYST EXCISION         Family History   Problem Relation Age of Onset   • Breast cancer Maternal Aunt 18   • Lung cancer Mother    • Heart disease Father    • Parkinsonism Father    • Diabetes Father    • Alzheimer's disease Father        Social History     Socioeconomic History   • Marital status:      Spouse name: Not on file   • Number of children: Not on file   • Years of education: Not on  "file   • Highest education level: Not on file   Tobacco Use   • Smoking status: Never Smoker   • Smokeless tobacco: Never Used   Substance and Sexual Activity   • Alcohol use: No   • Drug use: No       No Known Allergies    Immunization History   Administered Date(s) Administered   • Tdap 01/27/2016        Health Maintenance Due   Topic Date Due   • PNEUMOCOCCAL VACCINE (19-64 HIGHEST RISK) (1 of 3 - PCV13) 12/18/1979   • ZOSTER VACCINE (1 of 2) 12/18/2010   • HEPATITIS C SCREENING  01/14/2019   • PAP SMEAR  01/14/2019   • MAMMOGRAM  02/22/2019   • INFLUENZA VACCINE  08/01/2020        Review of Systems     HEENT: Denies headache dizzy vision or hearing change  NECK: Denies dysphasia or reflux  CHEST: Denies cough wheeze or shortness of breath non-smoker no recent lung infections  CARDIAC: Denies chest pain pressure palpitations  ABD: Denies nausea vomiting abdominal pain  : Denies dysuria frequency  NEURO: Denies syncope concussion or neuropathy  PSYCH: Denies anxiety depression  EXTREM: Denies extremity discomfort or edema    Vital Signs  Vitals:    08/03/20 1424   BP: 114/84   BP Location: Left arm   Patient Position: Sitting   Cuff Size: Adult   Pulse: 88   Temp: 96.4 °F (35.8 °C)   Weight: 71 kg (156 lb 9.6 oz)   Height: 157.5 cm (62\")   PainSc: 0-No pain     Body mass index is 28.64 kg/m².  Patient's Body mass index is 28.64 kg/m². BMI is within normal parameters. No follow-up required..     Advance Care Planning   ACP discussion was held with the patient during this visit. Patient does not have an advance directive, information provided.       Current Outpatient Medications:   •  ELDERBERRY PO, Take 1 tablet by mouth Daily., Disp: , Rfl:   •  fluticasone (FLONASE) 50 MCG/ACT nasal spray, 2 sprays into the nostril(s) as directed by provider Daily., Disp: , Rfl:   •  lisinopril (PRINIVIL,ZESTRIL) 5 MG tablet, TAKE 1 TABLET BY MOUTH EVERY DAY, Disp: 90 tablet, Rfl: 0  •  Multiple Vitamins-Minerals (MULTIVITAMIN " ADULT PO), Take 1 tablet by mouth Daily., Disp: , Rfl:   •  simvastatin (ZOCOR) 40 MG tablet, TAKE 1 TABLET BY MOUTH EVERY EVENING, Disp: 90 tablet, Rfl: 3  •  Vitamin D, Ergocalciferol, 2000 units capsule, Take 2 capsules by mouth Daily., Disp: , Rfl:     Physical Exam   HEENT: Pupils equal reactive ENT clear no facial asymmetry pharynx is clear  NECK: No masses bruit thyromegaly or neck vein distention  CHEST: Good breath sounds without rales wheezes or rhonchi  CARDIAC: Regular rhythm without gallop rub or click  ABD: Liver spleen normal good bowel sounds nontender  : Deferred  NEURO: Intact  PSYCH: Normal  EXTREM: Normal  Skin: Clear     Results Review:    Labs of July 30 discussed  EKG of sinus rhythm left atrial enlargement no ischemia asymptomatic    ECG 12 Lead  Date/Time: 8/3/2020 6:43 PM  Performed by: Kimani Adams MD  Authorized by: Kimani Adams MD   Comparison: not compared with previous ECG   Rhythm: sinus rhythm  Rate: normal  Conduction: conduction normal  ST Segments: ST segments normal  T Waves: T waves normal  QRS axis: normal  Other findings: left atrial abnormality    Clinical impression: non-specific ECG  Comments: EKG sinus rhythm normal heart rate normal intervals left atrial enlargement no ischemia asymptomatic        Patient Wellness Counseling:   Plan of care reviewed with patient at the conclusion of today's visit. Education was provided in regards to diagnosis, diet and exercise, cervical cancer screening, self breast exams, breast cancer screening, and the importance of yearly mammograms.   Nutrition, family planning/contraception, physical activity, healthy weight,ways to reduce stress, adequate sleep, injury prevention, misuse of tobacco, alcohol and drugs, sexual behavior and STD's, dental health, mental health, and immunizations.    Management and any prescribed or recommended OTC medications.  Patient verbalizes understanding of and agreement with management  plan.      Medication Review: Medications reviewed and noted    Patient wellness counseling  Exercise: Encouraged walking exercise  Diet: Encouraged healthy cardiac diet  Smoking: Non-smoker  Alcohol: None alcohol  Screening: Recent lab discussed EKG discussed    Assessment/Plan:  Problem List Items Addressed This Visit        Cardiovascular and Mediastinum    Essential hypertension - Primary    Overview     Essential hypertension treated with lisinopril 5 mg daily CMP normal         Current Assessment & Plan     Essential hypertension treated with lisinopril 5 mg daily denies headache or cough recent CMP normal EKG normal no change therapy         Relevant Medications    lisinopril (PRINIVIL,ZESTRIL) 5 MG tablet    Other Relevant Orders    ECG 12 Lead    Hyperlipidemia    Overview     Mixed  Hyperlipidemia treated with simvastatin 40 mg daily denies myalgia arthralgia         Current Assessment & Plan       Mixed hyperlipidemia treated with simvastatin 40 mg daily recent lab cholesterol 150, LDL 77 continue simvastatin 40         Relevant Medications    simvastatin (ZOCOR) 40 MG tablet       Digestive    Vitamin D deficiency    Current Assessment & Plan     Vitamin D deficiency treated with vitamin D3 4000 units daily continue therapy            Hematopoietic and Hemostatic    Hodgkin's disease (CMS/HCC)    Overview     Unspecified type  Treated with chemotherapy 1990         Current Assessment & Plan     No residual, liver and spleen normal, CBC and CMP normal                Patient Instructions   Lab of July 30 discussed  EKG of this day discussed  Continue all current medications  Encouraged walking exercise and healthy cardiac diet  Return visit in 6 months or as needed       CMP:  Lab Results   Component Value Date    BUN 17 07/30/2020    CREATININE 0.78 07/30/2020    EGFRIFNONA 76 07/30/2020    BCR 21.8 07/30/2020     07/30/2020    K 4.2 07/30/2020    CO2 25.7 07/30/2020    CALCIUM 10.4 07/30/2020     ALBUMIN 4.70 07/30/2020    BILITOT 0.8 07/30/2020    ALKPHOS 36 (L) 07/30/2020    AST 24 07/30/2020    ALT 27 07/30/2020     HbA1c:  No results found for: HGBA1C  Microalbumin:  No results found for: MICROALBUR, POCMALB, POCCREAT  Lipid Panel  Lab Results   Component Value Date    CHOL 150 07/30/2020    TRIG 94 07/30/2020    HDL 54 07/30/2020    LDL 77 07/30/2020    AST 24 07/30/2020    ALT 27 07/30/2020        Counseling was given to patient for the following topics: disease prevention.    Plan of care reviewed with patient at the conclusion of today's visit. Education was provided regarding diagnosis, management, and any prescribed or recommended OTC medications.Patient verbalizes understanding of and agreement with management plan.         Kimani Adams MD    Note: Part of this note may be an electronic transcription/translation of spoken language to printed text using Dragon Dictation System.

## 2020-08-03 NOTE — ASSESSMENT & PLAN NOTE
Essential hypertension treated with lisinopril 5 mg daily denies headache or cough recent CMP normal EKG normal no change therapy

## 2020-08-24 RX ORDER — AMOXICILLIN AND CLAVULANATE POTASSIUM 500; 125 MG/1; MG/1
1 TABLET, FILM COATED ORAL 2 TIMES DAILY
Qty: 14 TABLET | Refills: 0 | Status: SHIPPED | OUTPATIENT
Start: 2020-08-24 | End: 2020-08-31

## 2020-10-16 RX ORDER — LISINOPRIL 5 MG/1
TABLET ORAL
Qty: 90 TABLET | Refills: 3 | Status: SHIPPED | OUTPATIENT
Start: 2020-10-16 | End: 2021-10-14

## 2020-10-22 ENCOUNTER — FLU SHOT (OUTPATIENT)
Dept: INTERNAL MEDICINE | Facility: CLINIC | Age: 60
End: 2020-10-22

## 2020-10-22 PROCEDURE — 90471 IMMUNIZATION ADMIN: CPT | Performed by: INTERNAL MEDICINE

## 2020-10-22 PROCEDURE — 90686 IIV4 VACC NO PRSV 0.5 ML IM: CPT | Performed by: INTERNAL MEDICINE

## 2020-10-26 RX ORDER — AMOXICILLIN 500 MG/1
500 CAPSULE ORAL 2 TIMES DAILY
Qty: 14 CAPSULE | Refills: 0 | Status: SHIPPED | OUTPATIENT
Start: 2020-10-26 | End: 2021-02-16

## 2021-02-16 ENCOUNTER — OFFICE VISIT (OUTPATIENT)
Dept: INTERNAL MEDICINE | Facility: CLINIC | Age: 61
End: 2021-02-16

## 2021-02-16 DIAGNOSIS — J30.9 CHRONIC ALLERGIC RHINITIS: Primary | ICD-10-CM

## 2021-02-16 DIAGNOSIS — E78.2 MIXED HYPERLIPIDEMIA: ICD-10-CM

## 2021-02-16 DIAGNOSIS — I10 ESSENTIAL HYPERTENSION: ICD-10-CM

## 2021-02-16 PROCEDURE — 99442 PR PHYS/QHP TELEPHONE EVALUATION 11-20 MIN: CPT | Performed by: INTERNAL MEDICINE

## 2021-02-16 NOTE — ASSESSMENT & PLAN NOTE
Mixed hyperlipidemia on simvastatin 40 mg daily denies myalgia arthralgia is taking her medicines on a regular basis, continue therapy and return to clinic for fasting CMP and lipid.

## 2021-02-16 NOTE — ASSESSMENT & PLAN NOTE
Chronic allergy currently stable on antihistamine Flonase and normal saline spray continue therapy as needed

## 2021-02-16 NOTE — ASSESSMENT & PLAN NOTE
History of mild essential hypertension well-controlled on lisinopril 5 mg denies headache or hyperactive airway cough continue current therapy and return to clinic for fasting CMP

## 2021-02-16 NOTE — PROGRESS NOTES
[facility]     Hiwot Ren  1960   [id number]      Patient Care Team:  Kimani Adams MD as PCP - General (Internal Medicine)    Chief Complaint:: No chief complaint on file.     Essential hypertension  Hypercholesterolemia  Chronic allergy    You have chosen to receive care through a telephone visit. Do you consent to use a telephone visit for your medical care today? Yes    This visit has been rescheduled as a phone visit to comply with patient safety concerns in accordance with CDC recommendations. Total time of discussion was 12 minutes.      HPI  Patient 60-year-old female presents for telephone visit secondary to weather problems and office closure, patient states she overall feels well recent sinus infection treated with amoxicillin and improved on Flonase as needed and over-the-counter antihistamine as well as nasal saline with a history of mixed hyperlipidemia on simvastatin 40mg history of essential hypertension on lisinopril 5 mg and a history of non-Hodgkin's lymphoma treated to resolution in 1990.  Patient is an employee of [employer] in [place] and has had no direct exposure to Covid infections does wear a mask continuously at work and when out she has not been tested has had no symptoms.  I have suggested she resign up for the Covid vaccination if she would be in the category of 1B because of her history of allergies lymphoma.      Patient Active Problem List   Diagnosis   • Colitis   • Essential hypertension   • Hyperlipidemia   • Dehydration   • Leukocytosis   • Lower GI bleed   • Chronic allergic rhinitis   • Vitamin D deficiency   • Otitis   • History of Hodgkin's disease   • Hodgkin's disease (CMS/HCC)   • Ischemic colitis (CMS/HCC)   • Melasma   • Elevated platelet count        Past Medical History:   Diagnosis Date   • Colitis 07/02/2015    MED RX 07/04/2018   • Drug therapy    • Hodgkin's lymphoma (CMS/HCC) 1991    chemo-25 yrs ago    • Hyperlipidemia    •  Hypertension        Past Surgical History:   Procedure Laterality Date   • BREAST BIOPSY     • BREAST CYST EXCISION         Family History   Problem Relation Age of Onset   • Breast cancer Maternal Aunt 18   • Lung cancer Mother    • Heart disease Father    • Parkinsonism Father    • Diabetes Father    • Alzheimer's disease Father        Social History     Socioeconomic History   • Marital status:      Spouse name: Not on file   • Number of children: Not on file   • Years of education: Not on file   • Highest education level: Not on file   Tobacco Use   • Smoking status: Never Smoker   • Smokeless tobacco: Never Used   Substance and Sexual Activity   • Alcohol use: No   • Drug use: No       No Known Allergies    Review of Systems     HEENT: Chronic allergy currently stable on antihistamine and as needed Flonase and nasal state saline sprays denies headache or dizzy vision or hearing change  NECK: Denies pain stiffness swelling or dysphagia  CHEST: No recent chest infections no cough wheeze shortness of breath  CARDIAC: Denies chest pain or pressure history of essential hypertension on lisinopril 5 mg  ABD: Denies nausea vomiting abdominal pain  : Denies dysuria frequency  NEURO: Denies syncope concussion  PSYCH: Denies anxiety depression  EXTREM: Denies arthritic pain or edema    Vital Signs  There were no vitals filed for this visit.  There is no height or weight on file to calculate BMI.  Patient's There is no height or weight on file to calculate BMI. BMI is within normal parameters. No follow-up required..     Advance Care Planning         Current Outpatient Medications:   •  ELDERBERRY PO, Take 1 tablet by mouth Daily., Disp: , Rfl:   •  fluticasone (FLONASE) 50 MCG/ACT nasal spray, 2 sprays into the nostril(s) as directed by provider Daily., Disp: , Rfl:   •  lisinopril (PRINIVIL,ZESTRIL) 5 MG tablet, TAKE 1 TABLET BY MOUTH EVERY DAY, Disp: 90 tablet, Rfl: 3  •  Multiple Vitamins-Minerals  (MULTIVITAMIN ADULT PO), Take 1 tablet by mouth Daily., Disp: , Rfl:   •  simvastatin (ZOCOR) 40 MG tablet, TAKE 1 TABLET BY MOUTH EVERY EVENING, Disp: 90 tablet, Rfl: 3  •  Vitamin D, Ergocalciferol, 2000 units capsule, Take 2 capsules by mouth Daily., Disp: , Rfl:     Physical Exam     ACE III MINI      No physical exam performed as this is a telephone visit however the patient denies facial asymmetry denies neck masses or lumps denies respiratory distress states her heart rate and blood pressure are regular and blood pressures have been in a normal range denies abdominal distention denies leg edema arthritic soreness or recent injury and denies skin infection or lesions.       Results Review:    No results found for this or any previous visit (from the past 672 hour(s)).  Procedures    Medication Review: Medications reviewed and noted    Patient wellness counseling  Exercise: Encouraged healthy cardiac exercise walking  Diet: Encouraged healthy cardiac diet  Smoking: Non-smoker  Alcohol: None alcohol  Screening: Return to clinic for fasting CMP and lipid when the weather is improved in the office lab is open    Assessment/Plan:    Problem List Items Addressed This Visit        Allergies and Adverse Reactions    Chronic allergic rhinitis - Primary    Overview     Chronic allergic rhinitis with infrequent sinus infections on over-the-counter antihistamine, Flonase as needed and nasal spray of normal saline         Current Assessment & Plan     Chronic allergy currently stable on antihistamine Flonase and normal saline spray continue therapy as needed            Cardiac and Vasculature    Essential hypertension    Overview     Essential hypertension treated with lisinopril 5 mg daily CMP normal         Current Assessment & Plan       History of mild essential hypertension well-controlled on lisinopril 5 mg denies headache or hyperactive airway cough continue current therapy and return to clinic for fasting CMP          Relevant Medications    lisinopril (PRINIVIL,ZESTRIL) 5 MG tablet    Hyperlipidemia    Overview     Mixed  Hyperlipidemia treated with simvastatin 40 mg daily denies myalgia arthralgia         Current Assessment & Plan       Mixed hyperlipidemia on simvastatin 40 mg daily denies myalgia arthralgia is taking her medicines on a regular basis, continue therapy and return to clinic for fasting CMP and lipid.         Relevant Medications    simvastatin (ZOCOR) 40 MG tablet           Patient Instructions   Telemedicine visit  Return to clinic when weather clears for fasting CMP and lipid  Continue current therapy  Encouraged healthy exercise walking and healthy cardiac diet  Return visit for preventative exam and physical in 6 months or as needed       Plan of care reviewed with patient at the conclusion of today's visit. Education was provided regarding diagnosis, management, and any prescribed or recommended OTC medications.Patient verbalizes understanding of and agreement with management plan.         Kimani Adams MD      Note: Part of this note may be an electronic transcription/translation of spoken language to printed text using the Dragon Dictation system.

## 2021-02-16 NOTE — PATIENT INSTRUCTIONS
Telemedicine visit  Return to clinic when weather clears for fasting CMP and lipid  Continue current therapy  Encouraged healthy exercise walking and healthy cardiac diet  Return visit for preventative exam and physical in 6 months or as needed

## 2021-04-19 RX ORDER — SIMVASTATIN 40 MG
TABLET ORAL
Qty: 90 TABLET | Refills: 3 | Status: SHIPPED | OUTPATIENT
Start: 2021-04-19 | End: 2022-04-12

## 2021-04-23 ENCOUNTER — HOSPITAL ENCOUNTER (OUTPATIENT)
Dept: GENERAL RADIOLOGY | Facility: HOSPITAL | Age: 61
Discharge: HOME | End: 2021-04-23
Admitting: PHYSICIAN ASSISTANT

## 2021-04-23 ENCOUNTER — OFFICE VISIT (OUTPATIENT)
Dept: INTERNAL MEDICINE | Facility: CLINIC | Age: 61
End: 2021-04-23

## 2021-04-23 DIAGNOSIS — I10 ESSENTIAL HYPERTENSION: ICD-10-CM

## 2021-04-23 DIAGNOSIS — M25.561 ACUTE PAIN OF RIGHT KNEE: Primary | ICD-10-CM

## 2021-04-23 DIAGNOSIS — M25.561 ACUTE PAIN OF RIGHT KNEE: ICD-10-CM

## 2021-04-23 PROCEDURE — 99214 OFFICE O/P EST MOD 30 MIN: CPT | Performed by: PHYSICIAN ASSISTANT

## 2021-04-23 PROCEDURE — 73562 X-RAY EXAM OF KNEE 3: CPT

## 2021-04-23 NOTE — PROGRESS NOTES
Patient Care Team:  Kimani Adams MD as PCP - General (Internal Medicine)    Chief Complaint::   Chief Complaint   Patient presents with   • Knee Pain     right knee         Subjective     HPI  Hiwot is a 60 year old female who presents with right knee pain. Currently works at [employer], standing for ~ 8 hours a day. Knee pain developed several months ago. Pain started while she was down in a deep squat at home, and felt a pull on the medial aspect of her right knee.  Gradually worsening, she now experiences the pain everyday.  She has also recently started planting and working in her yard more. Ice has helped.  She has biofreeze, which has not really helped.   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          The following portions of the patient's history were reviewed and updated as appropriate: active problem list, medication list, allergies, family history, social history    Review of Systems:   Review of Systems   Constitutional: Positive for activity change. Negative for appetite change, diaphoresis, fatigue, unexpected weight gain and unexpected weight loss.   HENT: Negative for hearing loss.    Eyes: Negative for visual disturbance.   Respiratory: Negative for chest tightness and shortness of breath.    Cardiovascular: Negative for chest pain, palpitations and leg swelling.   Gastrointestinal: Negative for abdominal pain, blood in stool, GERD and indigestion.   Endocrine: Negative for cold intolerance and heat intolerance.   Genitourinary: Negative for dysuria and hematuria.   Musculoskeletal: Positive for arthralgias, gait problem, joint swelling and myalgias.   Skin: Negative.  Negative for skin lesions.   Neurological: Negative for tremors, seizures, syncope, speech difficulty, weakness, numbness, headache, memory problem and confusion.   Hematological: Does not bruise/bleed easily.   Psychiatric/Behavioral: Negative for sleep disturbance and depressed mood. The patient is not nervous/anxious.   "      Vital Signs  Vitals:    04/23/21 1107 04/24/21 1327   BP:  118/78   Height: 157.5 cm (62.01\")    PainSc:   6      Body mass index is 28.64 kg/m².    Labs  No visits with results within 3 Month(s) from this visit.   Latest known visit with results is:   Lab on 07/30/2020   Component Date Value Ref Range Status   • Glucose 07/30/2020 89  65 - 99 mg/dL Final   • BUN 07/30/2020 17  6 - 20 mg/dL Final   • Creatinine 07/30/2020 0.78  0.57 - 1.00 mg/dL Final   • Sodium 07/30/2020 138  136 - 145 mmol/L Final   • Potassium 07/30/2020 4.2  3.5 - 5.2 mmol/L Final   • Chloride 07/30/2020 100  98 - 107 mmol/L Final   • CO2 07/30/2020 25.7  22.0 - 29.0 mmol/L Final   • Calcium 07/30/2020 10.4  8.6 - 10.5 mg/dL Final   • Total Protein 07/30/2020 7.0  6.0 - 8.5 g/dL Final   • Albumin 07/30/2020 4.70  3.50 - 5.20 g/dL Final   • ALT (SGPT) 07/30/2020 27  1 - 33 U/L Final   • AST (SGOT) 07/30/2020 24  1 - 32 U/L Final   • Alkaline Phosphatase 07/30/2020 36* 39 - 117 U/L Final   • Total Bilirubin 07/30/2020 0.8  0.0 - 1.2 mg/dL Final   • eGFR Non African Amer 07/30/2020 76  >60 mL/min/1.73 Final   • Globulin 07/30/2020 2.3  gm/dL Final   • A/G Ratio 07/30/2020 2.0  g/dL Final   • BUN/Creatinine Ratio 07/30/2020 21.8  7.0 - 25.0 Final   • Anion Gap 07/30/2020 12.3  5.0 - 15.0 mmol/L Final   • Total Cholesterol 07/30/2020 150  0 - 200 mg/dL Final   • Triglycerides 07/30/2020 94  0 - 150 mg/dL Final   • HDL Cholesterol 07/30/2020 54  40 - 60 mg/dL Final   • LDL Cholesterol  07/30/2020 77  0 - 100 mg/dL Final   • VLDL Cholesterol 07/30/2020 18.8  5 - 40 mg/dL Final   • LDL/HDL Ratio 07/30/2020 1.43   Final   • TSH 07/30/2020 2.230  0.270 - 4.200 uIU/mL Final   • WBC 07/30/2020 4.23  3.40 - 10.80 10*3/mm3 Final   • RBC 07/30/2020 5.00  3.77 - 5.28 10*6/mm3 Final   • Hemoglobin 07/30/2020 15.0  12.0 - 15.9 g/dL Final   • Hematocrit 07/30/2020 44.3  34.0 - 46.6 % Final   • MCV 07/30/2020 88.6  79.0 - 97.0 fL Final   • MCH 07/30/2020 " 30.0  26.6 - 33.0 pg Final   • MCHC 07/30/2020 33.9  31.5 - 35.7 g/dL Final   • RDW 07/30/2020 12.6  12.3 - 15.4 % Final   • RDW-SD 07/30/2020 41.5  37.0 - 54.0 fl Final   • MPV 07/30/2020 9.4  6.0 - 12.0 fL Final   • Platelets 07/30/2020 332  140 - 450 10*3/mm3 Final   • Neutrophil % 07/30/2020 58.2  42.7 - 76.0 % Final   • Lymphocyte % 07/30/2020 29.6  19.6 - 45.3 % Final   • Monocyte % 07/30/2020 9.2  5.0 - 12.0 % Final   • Eosinophil % 07/30/2020 1.9  0.3 - 6.2 % Final   • Basophil % 07/30/2020 0.9  0.0 - 1.5 % Final   • Immature Grans % 07/30/2020 0.2  0.0 - 0.5 % Final   • Neutrophils, Absolute 07/30/2020 2.46  1.70 - 7.00 10*3/mm3 Final   • Lymphocytes, Absolute 07/30/2020 1.25  0.70 - 3.10 10*3/mm3 Final   • Monocytes, Absolute 07/30/2020 0.39  0.10 - 0.90 10*3/mm3 Final   • Eosinophils, Absolute 07/30/2020 0.08  0.00 - 0.40 10*3/mm3 Final   • Basophils, Absolute 07/30/2020 0.04  0.00 - 0.20 10*3/mm3 Final   • Immature Grans, Absolute 07/30/2020 0.01  0.00 - 0.05 10*3/mm3 Final   • nRBC 07/30/2020 0.0  0.0 - 0.2 /100 WBC Final         Current Outpatient Medications:   •  ELDERBERRY PO, Take 1 tablet by mouth Daily., Disp: , Rfl:   •  fluticasone (FLONASE) 50 MCG/ACT nasal spray, 2 sprays into the nostril(s) as directed by provider Daily., Disp: , Rfl:   •  lisinopril (PRINIVIL,ZESTRIL) 5 MG tablet, TAKE 1 TABLET BY MOUTH EVERY DAY, Disp: 90 tablet, Rfl: 3  •  Multiple Vitamins-Minerals (MULTIVITAMIN ADULT PO), Take 1 tablet by mouth Daily., Disp: , Rfl:   •  simvastatin (ZOCOR) 40 MG tablet, TAKE 1 TABLET BY MOUTH EVERY DAY IN THE EVENING, Disp: 90 tablet, Rfl: 3  •  Vitamin D, Ergocalciferol, 2000 units capsule, Take 2 capsules by mouth 1 (One) Time Per Week., Disp: , Rfl:   •  Diclofenac Sodium (VOLTAREN) 1 % gel gel, Apply 4 g topically to the appropriate area as directed 3 (Three) Times a Day., Disp: 50 g, Rfl: 3    Physical Exam:    Physical Exam  Vitals reviewed.   Constitutional:       Appearance:  Normal appearance. She is well-developed.   HENT:      Head: Normocephalic and atraumatic.      Right Ear: Hearing, tympanic membrane, ear canal and external ear normal.      Left Ear: Hearing, tympanic membrane, ear canal and external ear normal.      Nose: Nose normal.      Mouth/Throat:      Pharynx: Uvula midline.   Eyes:      General: Lids are normal.      Conjunctiva/sclera: Conjunctivae normal.      Pupils: Pupils are equal, round, and reactive to light.   Cardiovascular:      Rate and Rhythm: Normal rate and regular rhythm.      Heart sounds: Normal heart sounds.   Pulmonary:      Effort: Pulmonary effort is normal.      Breath sounds: Normal breath sounds.   Abdominal:      General: Bowel sounds are normal.      Palpations: Abdomen is soft.   Musculoskeletal:         General: Normal range of motion.      Cervical back: Full passive range of motion without pain, normal range of motion and neck supple.      Right knee: Swelling present. No crepitus. Tenderness present over the medial joint line and MCL. No patellar tendon tenderness.      Left knee: No tenderness. No medial joint line or MCL tenderness.   Skin:     General: Skin is warm and dry.   Neurological:      Mental Status: She is alert and oriented to person, place, and time.      Deep Tendon Reflexes: Reflexes are normal and symmetric.   Psychiatric:         Speech: Speech normal.         Behavior: Behavior normal.         Thought Content: Thought content normal.         Judgment: Judgment normal.         Procedures        Assessment/Plan   Problem List Items Addressed This Visit        Cardiac and Vasculature    Essential hypertension    Overview     Essential hypertension treated with lisinopril 5 mg daily CMP normal         Relevant Medications    lisinopril (PRINIVIL,ZESTRIL) 5 MG tablet       Musculoskeletal and Injuries    Acute pain of right knee - Primary    Overview     Voltaren gel apply twice daily  Xray today  Referral to [facility]  physical therapy    Discussed possible referral to orthopedics if xray shows significant degenerative changes.          Relevant Orders    XR Knee 3 View Right (Completed)    Ambulatory Referral to Physical Therapy Evaluate and treat (Completed)          Return in about 6 weeks (around 6/4/2021) for Recheck.    Plan of care reviewed with patient at the conclusion of today's visit. Education was provided regarding diagnosis, management, and any prescribed or recommended OTC medications.Patient verbalizes understanding of and agreement with management plan.       Minnie Fatima PA-C    Please note that portions of this note were completed with a voice recognition program. Efforts were made to edit the dictations, but occasionally words are mistranscribed.

## 2021-04-24 VITALS — DIASTOLIC BLOOD PRESSURE: 78 MMHG | SYSTOLIC BLOOD PRESSURE: 118 MMHG | HEIGHT: 62 IN | BODY MASS INDEX: 28.64 KG/M2

## 2021-04-24 PROBLEM — M25.561 ACUTE PAIN OF RIGHT KNEE: Status: ACTIVE | Noted: 2021-04-24

## 2021-04-25 DIAGNOSIS — M25.561 ACUTE PAIN OF RIGHT KNEE: Primary | ICD-10-CM

## 2021-04-26 ENCOUNTER — TELEPHONE (OUTPATIENT)
Dept: INTERNAL MEDICINE | Facility: CLINIC | Age: 61
End: 2021-04-26

## 2021-04-26 NOTE — TELEPHONE ENCOUNTER
----- Message from Minnie Fatima PA-C sent at 4/25/2021  9:57 PM EDT -----  Hiwot, I do recommend you followup with orthopedics as well as physical therapy.  An order has been placed.

## 2021-04-27 ENCOUNTER — TELEPHONE (OUTPATIENT)
Dept: INTERNAL MEDICINE | Facility: CLINIC | Age: 61
End: 2021-04-27

## 2021-04-27 NOTE — TELEPHONE ENCOUNTER
PT CALLED BACK AND I READ HER THE MESSAGE.  PT STATED SHE TALKED TO SOMEONE YESTERDAY ABOUT THE SAME THING AND WASN'T OF THE FOLLOW UP CALL.  WE WENT OVER THE PHYSICAL THERAPY ORDER AND THE REFERRAL TO ORTHO.  PT VERBALIZED AND UNDERSTOOD.

## 2021-06-28 ENCOUNTER — LAB (OUTPATIENT)
Dept: LAB | Facility: HOSPITAL | Age: 61
End: 2021-06-28

## 2021-06-28 DIAGNOSIS — I10 ESSENTIAL HYPERTENSION: ICD-10-CM

## 2021-06-28 DIAGNOSIS — E78.2 MIXED HYPERLIPIDEMIA: ICD-10-CM

## 2021-06-28 DIAGNOSIS — J30.9 CHRONIC ALLERGIC RHINITIS: ICD-10-CM

## 2021-06-28 PROCEDURE — 80053 COMPREHEN METABOLIC PANEL: CPT

## 2021-06-28 PROCEDURE — 80061 LIPID PANEL: CPT

## 2021-06-29 LAB
ALBUMIN SERPL-MCNC: 4.5 G/DL (ref 3.5–5.2)
ALBUMIN/GLOB SERPL: 2 G/DL
ALP SERPL-CCNC: 40 U/L (ref 39–117)
ALT SERPL W P-5'-P-CCNC: 22 U/L (ref 1–33)
ANION GAP SERPL CALCULATED.3IONS-SCNC: 8.1 MMOL/L (ref 5–15)
AST SERPL-CCNC: 26 U/L (ref 1–32)
BILIRUB SERPL-MCNC: 0.7 MG/DL (ref 0–1.2)
BUN SERPL-MCNC: 14 MG/DL (ref 8–23)
BUN/CREAT SERPL: 20 (ref 7–25)
CALCIUM SPEC-SCNC: 9.8 MG/DL (ref 8.6–10.5)
CHLORIDE SERPL-SCNC: 104 MMOL/L (ref 98–107)
CHOLEST SERPL-MCNC: 157 MG/DL (ref 0–200)
CO2 SERPL-SCNC: 25.9 MMOL/L (ref 22–29)
CREAT SERPL-MCNC: 0.7 MG/DL (ref 0.57–1)
GFR SERPL CREATININE-BSD FRML MDRD: 85 ML/MIN/1.73
GLOBULIN UR ELPH-MCNC: 2.3 GM/DL
GLUCOSE SERPL-MCNC: 99 MG/DL (ref 65–99)
HDLC SERPL-MCNC: 49 MG/DL (ref 40–60)
LDLC SERPL CALC-MCNC: 89 MG/DL (ref 0–100)
LDLC/HDLC SERPL: 1.77 {RATIO}
POTASSIUM SERPL-SCNC: 4.7 MMOL/L (ref 3.5–5.2)
PROT SERPL-MCNC: 6.8 G/DL (ref 6–8.5)
SODIUM SERPL-SCNC: 138 MMOL/L (ref 136–145)
TRIGL SERPL-MCNC: 107 MG/DL (ref 0–150)
VLDLC SERPL-MCNC: 19 MG/DL (ref 5–40)

## 2021-10-14 RX ORDER — LISINOPRIL 5 MG/1
TABLET ORAL
Qty: 90 TABLET | Refills: 3 | Status: SHIPPED | OUTPATIENT
Start: 2021-10-14 | End: 2022-10-06

## 2021-10-18 ENCOUNTER — LAB (OUTPATIENT)
Dept: LAB | Facility: HOSPITAL | Age: 61
End: 2021-10-18

## 2021-10-18 ENCOUNTER — OFFICE VISIT (OUTPATIENT)
Dept: INTERNAL MEDICINE | Facility: CLINIC | Age: 61
End: 2021-10-18

## 2021-10-18 VITALS
BODY MASS INDEX: 30.03 KG/M2 | RESPIRATION RATE: 16 BRPM | DIASTOLIC BLOOD PRESSURE: 78 MMHG | OXYGEN SATURATION: 99 % | TEMPERATURE: 97.7 F | HEART RATE: 88 BPM | WEIGHT: 163.2 LBS | HEIGHT: 62 IN | SYSTOLIC BLOOD PRESSURE: 122 MMHG

## 2021-10-18 DIAGNOSIS — E55.9 VITAMIN D DEFICIENCY: ICD-10-CM

## 2021-10-18 DIAGNOSIS — I10 ESSENTIAL HYPERTENSION: ICD-10-CM

## 2021-10-18 DIAGNOSIS — E78.2 MIXED HYPERLIPIDEMIA: ICD-10-CM

## 2021-10-18 DIAGNOSIS — Z00.00 PREVENTATIVE HEALTH CARE: Primary | ICD-10-CM

## 2021-10-18 DIAGNOSIS — Z00.00 PREVENTATIVE HEALTH CARE: ICD-10-CM

## 2021-10-18 PROCEDURE — 80053 COMPREHEN METABOLIC PANEL: CPT

## 2021-10-18 PROCEDURE — 82306 VITAMIN D 25 HYDROXY: CPT

## 2021-10-18 PROCEDURE — 99396 PREV VISIT EST AGE 40-64: CPT | Performed by: INTERNAL MEDICINE

## 2021-10-18 PROCEDURE — 80061 LIPID PANEL: CPT

## 2021-10-18 PROCEDURE — 85025 COMPLETE CBC W/AUTO DIFF WBC: CPT

## 2021-10-18 PROCEDURE — 84443 ASSAY THYROID STIM HORMONE: CPT

## 2021-10-18 NOTE — PATIENT INSTRUCTIONS
Fasting labs pending  Continue current medications  Encouraged healthy cardiac diet and weight loss  Encouraged walking exercise  Return visit in 6 months or as needed

## 2021-10-18 NOTE — PROGRESS NOTES
[facility]     Hiwot Ren  1960   [id number]      Patient Care Team:  Kimani Adams MD as PCP - General (Internal Medicine)    Chief Complaint::   Chief Complaint   Patient presents with   • Annual Exam   • Hypertension     f.u   • Hyperlipidemia            HPI  Patient 6-year-old female with essential hypertension and mixed hyperlipidemia and mild overweight with Hodgkin's lymphoma treated in 1990 with chemotherapy overall feels well, presents for annual preventative visit and physical examination she denies headache or dizzy denies cough or wheeze denies chest pain or pressure.  The patient wears glasses her mammograms are due and have been scheduled the patient does walking exercise.  The patient is a non-smoker does not drink alcohol.  The patient recently had knee discomfort which is improved with physical therapy at [facility] physical therapy    Patient Active Problem List   Diagnosis   • Colitis   • Essential hypertension   • Hyperlipidemia   • Dehydration   • Leukocytosis   • Lower GI bleed   • Chronic allergic rhinitis   • Vitamin D deficiency   • Otitis   • History of Hodgkin's disease   • Hodgkin's disease (HCC)   • Ischemic colitis (HCC)   • Melasma   • Elevated platelet count   • Acute pain of right knee   • Preventative health care        Past Medical History:   Diagnosis Date   • Colitis 07/02/2015    MED RX 07/04/2018   • Drug therapy    • Hodgkin's lymphoma (HCC) 1991    chemo-25 yrs ago    • Hyperlipidemia    • Hypertension        Past Surgical History:   Procedure Laterality Date   • BREAST BIOPSY     • BREAST CYST EXCISION         Family History   Problem Relation Age of Onset   • Breast cancer Maternal Aunt 18   • Lung cancer Mother    • Heart disease Father    • Parkinsonism Father    • Diabetes Father    • Alzheimer's disease Father        Social History     Socioeconomic History   • Marital status:    Tobacco Use   • Smoking status: Never Smoker   • Smokeless  "tobacco: Never Used   Substance and Sexual Activity   • Alcohol use: No   • Drug use: No       No Known Allergies    Immunization History   Administered Date(s) Administered   • COVID-19 (PFIZER) 03/13/2021, 04/02/2021   • FluLaval/Fluarix/Fluzone >6 10/22/2020   • Tdap 01/27/2016        Health Maintenance Due   Topic Date Due   • Pneumococcal Vaccine 0-64 (1 of 4 - PCV13) Never done   • ZOSTER VACCINE (1 of 2) Never done   • HEPATITIS C SCREENING  Never done   • PAP SMEAR  Never done   • MAMMOGRAM  02/22/2019   • COVID-19 Vaccine (3 - Inadvertent mRNA risk 3-dose series) 04/30/2021   • INFLUENZA VACCINE  08/01/2021   • ANNUAL PHYSICAL  08/04/2021        Review of Systems     HEENT: Denies headache or dizzy vision or hearing change  NECK: Denies pain stiffness swelling dysphagia or reflux  CHEST: Non-smoker denies cough wheeze or shortness of breath  CARDIAC: Denies chest pain pressure palpitations  ABD: Denies nausea vomiting abdominal pain  : Denies dysuria frequency  NEURO: Denies syncope concussion  PSYCH: Denies anxiety depression  EXTREM: Denies extremity discomfort or edema    Vital Signs  Vitals:    10/18/21 1302 10/18/21 1328   BP: 132/88 122/78   BP Location: Left arm    Patient Position: Sitting    Cuff Size: Adult    Pulse: 88    Resp: 16    Temp: 97.7 °F (36.5 °C)    TempSrc: Infrared    SpO2: 99%    Weight: 74 kg (163 lb 3.2 oz)    Height: 157.5 cm (62\")    PainSc: 0-No pain      Body mass index is 29.85 kg/m².  Patient's Body mass index is 29.85 kg/m². indicating that she is overweight (BMI 25-29.9). Obesity-related health conditions include the following: hypertension and dyslipidemias. Obesity is unchanged. BMI is is above average; BMI management plan is completed. We discussed low calorie, low carb based diet program, portion control and increasing exercise..     Advance Care Planning         Current Outpatient Medications:   •  ELDERBERRY PO, Take 1 tablet by mouth Daily., Disp: , Rfl:   •  " fluticasone (FLONASE) 50 MCG/ACT nasal spray, 2 sprays into the nostril(s) as directed by provider Daily., Disp: , Rfl:   •  lisinopril (PRINIVIL,ZESTRIL) 5 MG tablet, TAKE 1 TABLET BY MOUTH EVERY DAY, Disp: 90 tablet, Rfl: 3  •  Multiple Vitamins-Minerals (MULTIVITAMIN ADULT PO), Take 1 tablet by mouth Daily., Disp: , Rfl:   •  simvastatin (ZOCOR) 40 MG tablet, TAKE 1 TABLET BY MOUTH EVERY DAY IN THE EVENING, Disp: 90 tablet, Rfl: 3  •  Vitamin D, Ergocalciferol, 2000 units capsule, Take 2 capsules by mouth 1 (One) Time Per Week., Disp: , Rfl:     Physical Exam   HEENT: Pupils equal reactive ENT clear no facial asymmetry pharynx is clear  NECK: No mass bruit thyromegaly neck vein distention  CHEST: Clear without rales or wheezing  CARDIAC: Regular rhythm without gallop rub blood pressure heart rate stable  ABD: Liver spleen normal positive bowel sounds no bruit  : Deferred  NEURO: Intact  PSYCH: Normal  EXTREM: Minimal arthritic change no edema  Skin: Clear     Results Review:    Fasting labs pending  Procedures Patient Wellness Counseling:   Plan of care reviewed with patient at the conclusion of today's visit. Education was provided in regards to diagnosis, diet and exercise, cervical cancer screening, self breast exams, breast cancer screening, and the importance of yearly mammograms.   Nutrition, family planning/contraception, physical activity, healthy weight,ways to reduce stress, adequate sleep, injury prevention, misuse of tobacco, alcohol and drugs, sexual behavior and STD's, dental health, mental health, and immunizations.    Management and any prescribed or recommended OTC medications.  Patient verbalizes understanding of and agreement with management plan.    Medication Review: Medications reviewed and noted    Patient wellness counseling  Exercise: Encouraged walking exercise  Diet: Healthy cardiac diet  Smoking: Non-smoker  Alcohol: None alcohol  Screening: Fasting labs  pending    Assessment/Plan:  Problem List Items Addressed This Visit        Cardiac and Vasculature    Essential hypertension    Overview     Essential hypertension treated with lisinopril 5 mg daily CMP normal         Current Assessment & Plan       Essential hypertension on lisinopril 5 mg daily denies headache or hyperactive airway cough current blood pressure 122/78 left and right sitting, CMP is pending continue therapy         Relevant Medications    lisinopril (PRINIVIL,ZESTRIL) 5 MG tablet    Other Relevant Orders    CBC & Differential    Comprehensive Metabolic Panel    Lipid Panel    Vitamin D 25 Hydroxy    TSH    Hyperlipidemia    Overview     Mixed  Hyperlipidemia treated with simvastatin 40 mg daily denies myalgia arthralgia         Current Assessment & Plan       Mixed hyperlipidemia on simvastatin 40 mg daily denies myalgia arthralgia fasting CMP and lipid are pending continue therapy and encouraged healthy cardiac diet and weight loss         Relevant Medications    simvastatin (ZOCOR) 40 MG tablet    Other Relevant Orders    CBC & Differential    Comprehensive Metabolic Panel    Lipid Panel    Vitamin D 25 Hydroxy    TSH       Endocrine and Metabolic    Vitamin D deficiency    Overview     Vitamin D deficiency on replacement of 4000 units daily, vitamin D levels pending continue therapy         Current Assessment & Plan     Vitamin D level pending continue 4000 units daily         Relevant Orders    Vitamin D 25 Hydroxy    TSH       Health Encounters    Preventative health care - Primary    Overview     Patient 6-year-old female presents for preventative annual wellness visit and physical exam         Relevant Orders    CBC & Differential    Comprehensive Metabolic Panel    Lipid Panel    Vitamin D 25 Hydroxy    TSH           Patient Instructions   Fasting labs pending  Continue current medications  Encouraged healthy cardiac diet and weight loss  Encouraged walking exercise  Return visit in 6  months or as needed       CMP:  Lab Results   Component Value Date    BUN 14 06/28/2021    CREATININE 0.70 06/28/2021    EGFRIFNONA 85 06/28/2021    BCR 20.0 06/28/2021     06/28/2021    K 4.7 06/28/2021    CO2 25.9 06/28/2021    CALCIUM 9.8 06/28/2021    ALBUMIN 4.50 06/28/2021    BILITOT 0.7 06/28/2021    ALKPHOS 40 06/28/2021    AST 26 06/28/2021    ALT 22 06/28/2021     HbA1c:  No results found for: HGBA1C  Microalbumin:  No results found for: MICROALBUR, POCMALB, POCCREAT  Lipid Panel  Lab Results   Component Value Date    CHOL 157 06/28/2021    TRIG 107 06/28/2021    HDL 49 06/28/2021    LDL 89 06/28/2021    AST 26 06/28/2021    ALT 22 06/28/2021        Counseling was given to patient for the following topics: disease prevention.    Plan of care reviewed with patient at the conclusion of today's visit. Education was provided regarding diagnosis, management, and any prescribed or recommended OTC medications.Patient verbalizes understanding of and agreement with management plan.         Kimani Adams MD    Note: Part of this note may be an electronic transcription/translation of spoken language to printed text using Dragon Dictation System.

## 2021-10-19 LAB
25(OH)D3 SERPL-MCNC: 24.5 NG/ML
ALBUMIN SERPL-MCNC: 5.1 G/DL (ref 3.5–5.2)
ALBUMIN/GLOB SERPL: 2 G/DL
ALP SERPL-CCNC: 53 U/L (ref 39–117)
ALT SERPL W P-5'-P-CCNC: 23 U/L (ref 1–33)
ANION GAP SERPL CALCULATED.3IONS-SCNC: 5.8 MMOL/L (ref 5–15)
AST SERPL-CCNC: 24 U/L (ref 1–32)
BASOPHILS # BLD AUTO: 0.04 10*3/MM3 (ref 0–0.2)
BASOPHILS NFR BLD AUTO: 0.8 % (ref 0–1.5)
BILIRUB SERPL-MCNC: 0.8 MG/DL (ref 0–1.2)
BUN SERPL-MCNC: 19 MG/DL (ref 8–23)
BUN/CREAT SERPL: 23.2 (ref 7–25)
CALCIUM SPEC-SCNC: 10.5 MG/DL (ref 8.6–10.5)
CHLORIDE SERPL-SCNC: 103 MMOL/L (ref 98–107)
CHOLEST SERPL-MCNC: 159 MG/DL (ref 0–200)
CO2 SERPL-SCNC: 29.2 MMOL/L (ref 22–29)
CREAT SERPL-MCNC: 0.82 MG/DL (ref 0.57–1)
DEPRECATED RDW RBC AUTO: 42.7 FL (ref 37–54)
EOSINOPHIL # BLD AUTO: 0.14 10*3/MM3 (ref 0–0.4)
EOSINOPHIL NFR BLD AUTO: 2.9 % (ref 0.3–6.2)
ERYTHROCYTE [DISTWIDTH] IN BLOOD BY AUTOMATED COUNT: 12.5 % (ref 12.3–15.4)
GFR SERPL CREATININE-BSD FRML MDRD: 71 ML/MIN/1.73
GLOBULIN UR ELPH-MCNC: 2.5 GM/DL
GLUCOSE SERPL-MCNC: 96 MG/DL (ref 65–99)
HCT VFR BLD AUTO: 48.7 % (ref 34–46.6)
HDLC SERPL-MCNC: 50 MG/DL (ref 40–60)
HGB BLD-MCNC: 15.2 G/DL (ref 12–15.9)
IMM GRANULOCYTES # BLD AUTO: 0.01 10*3/MM3 (ref 0–0.05)
IMM GRANULOCYTES NFR BLD AUTO: 0.2 % (ref 0–0.5)
LDLC SERPL CALC-MCNC: 92 MG/DL (ref 0–100)
LDLC/HDLC SERPL: 1.82 {RATIO}
LYMPHOCYTES # BLD AUTO: 1.57 10*3/MM3 (ref 0.7–3.1)
LYMPHOCYTES NFR BLD AUTO: 32.3 % (ref 19.6–45.3)
MCH RBC QN AUTO: 28.7 PG (ref 26.6–33)
MCHC RBC AUTO-ENTMCNC: 31.2 G/DL (ref 31.5–35.7)
MCV RBC AUTO: 92.1 FL (ref 79–97)
MONOCYTES # BLD AUTO: 0.53 10*3/MM3 (ref 0.1–0.9)
MONOCYTES NFR BLD AUTO: 10.9 % (ref 5–12)
NEUTROPHILS NFR BLD AUTO: 2.57 10*3/MM3 (ref 1.7–7)
NEUTROPHILS NFR BLD AUTO: 52.9 % (ref 42.7–76)
NRBC BLD AUTO-RTO: 0 /100 WBC (ref 0–0.2)
PLATELET # BLD AUTO: 371 10*3/MM3 (ref 140–450)
PMV BLD AUTO: 9.3 FL (ref 6–12)
POTASSIUM SERPL-SCNC: 5.2 MMOL/L (ref 3.5–5.2)
PROT SERPL-MCNC: 7.6 G/DL (ref 6–8.5)
RBC # BLD AUTO: 5.29 10*6/MM3 (ref 3.77–5.28)
SODIUM SERPL-SCNC: 138 MMOL/L (ref 136–145)
TRIGL SERPL-MCNC: 91 MG/DL (ref 0–150)
TSH SERPL DL<=0.05 MIU/L-ACNC: 2.02 UIU/ML (ref 0.27–4.2)
VLDLC SERPL-MCNC: 17 MG/DL (ref 5–40)
WBC # BLD AUTO: 4.86 10*3/MM3 (ref 3.4–10.8)

## 2022-04-12 RX ORDER — AZITHROMYCIN 250 MG/1
TABLET, FILM COATED ORAL
Qty: 6 TABLET | Refills: 0 | Status: SHIPPED | OUTPATIENT
Start: 2022-04-12 | End: 2022-07-20

## 2022-04-12 RX ORDER — SIMVASTATIN 40 MG
TABLET ORAL
Qty: 90 TABLET | Refills: 3 | Status: SHIPPED | OUTPATIENT
Start: 2022-04-12 | End: 2023-04-06

## 2022-07-15 DIAGNOSIS — I10 ESSENTIAL HYPERTENSION: Primary | ICD-10-CM

## 2022-07-15 DIAGNOSIS — E55.9 VITAMIN D DEFICIENCY: ICD-10-CM

## 2022-07-15 DIAGNOSIS — E78.2 MIXED HYPERLIPIDEMIA: ICD-10-CM

## 2022-07-20 ENCOUNTER — LAB (OUTPATIENT)
Dept: LAB | Facility: HOSPITAL | Age: 62
End: 2022-07-20

## 2022-07-20 ENCOUNTER — OFFICE VISIT (OUTPATIENT)
Dept: INTERNAL MEDICINE | Facility: CLINIC | Age: 62
End: 2022-07-20

## 2022-07-20 VITALS
HEIGHT: 62 IN | DIASTOLIC BLOOD PRESSURE: 80 MMHG | SYSTOLIC BLOOD PRESSURE: 132 MMHG | BODY MASS INDEX: 29.85 KG/M2 | WEIGHT: 162.2 LBS | HEART RATE: 84 BPM

## 2022-07-20 DIAGNOSIS — M25.561 ACUTE PAIN OF RIGHT KNEE: ICD-10-CM

## 2022-07-20 DIAGNOSIS — I10 ESSENTIAL HYPERTENSION: ICD-10-CM

## 2022-07-20 DIAGNOSIS — E55.9 VITAMIN D DEFICIENCY: ICD-10-CM

## 2022-07-20 DIAGNOSIS — E78.2 MIXED HYPERLIPIDEMIA: ICD-10-CM

## 2022-07-20 DIAGNOSIS — J30.9 CHRONIC ALLERGIC RHINITIS: ICD-10-CM

## 2022-07-20 DIAGNOSIS — I10 ESSENTIAL HYPERTENSION: Primary | ICD-10-CM

## 2022-07-20 PROCEDURE — 82306 VITAMIN D 25 HYDROXY: CPT

## 2022-07-20 PROCEDURE — 85025 COMPLETE CBC W/AUTO DIFF WBC: CPT

## 2022-07-20 PROCEDURE — 80061 LIPID PANEL: CPT

## 2022-07-20 PROCEDURE — 99214 OFFICE O/P EST MOD 30 MIN: CPT | Performed by: INTERNAL MEDICINE

## 2022-07-20 PROCEDURE — 80053 COMPREHEN METABOLIC PANEL: CPT
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
## 2022-07-20 NOTE — ASSESSMENT & PLAN NOTE
Right knee pain from degenerative arthritis disease improved with physical therapy she is [facility] and [facility]

## 2022-07-20 NOTE — ASSESSMENT & PLAN NOTE
The patient's blood pressure is well controlled at this time. She will continue lisinopril 5 mg daily.

## 2022-07-20 NOTE — PATIENT INSTRUCTIONS
Fasting lab drawn this afternoon is pending  Continue all current therapy  Continue healthy cardiac diet and weight loss  [facility]  Return visit in 6 months or as needed

## 2022-07-20 NOTE — PROGRESS NOTES
[facility]     Hiwot Ren  1960   [id number]      Patient Care Team:  Kimani Adams MD as PCP - General (Internal Medicine)    Chief Complaint::   Chief Complaint   Patient presents with   • Hypertension     6 mo follow up            HPI    Hiwot Ren is a 61-year-old female who presents for an office visit for essential hypertension, on lisinopril 5 mg daily, and mixed hyperlipidemia, on simvastatin 40 mg daily, and vitamin D deficiency, on 4000 units daily.    The patient states that she is feeling very well. She denies any recent changes in her medication. The patient states that she has been working out at the [facility] Monday, Wednesday, Friday, and Sunday. She states that she walks 2 miles and sometimes rides the stationary bike for 1.5 miles. The patient states that she feels better now that she started working out again. She states that she has also started walking outside. The patient states that she eats comfort food, but now she has cut out all junk food. She states that she feels like her weight is getting back to normal.      The patient states that she is still on Zocor, lisinopril, and vitamin D. She states that her allergies have been pretty good this year. She states that she uses Zyrtec-D if she gets stuffy. The patient states that she takes elderberry, which seems to help. She denies any difficulty swallowing, coughing, or wheezing. The patient denies any chest palpitations or pain. She denies any upset stomach. She states that she had trouble with her right knee, but it has gotten better with the exercise, walking, and physical therapy. The patient states that she went to therapy for 3 or 4 months. She states that she was told that she would never be able to bend it like she did before, but it has gotten almost back to normal.  She states that x-rays showed that she has degenerative changes.         Patient Active Problem List   Diagnosis   • Colitis   • Essential  "hypertension   • Hyperlipidemia   • Dehydration   • Leukocytosis   • Lower GI bleed   • Chronic allergic rhinitis   • Vitamin D deficiency   • Otitis   • History of Hodgkin's disease   • Hodgkin's disease (HCC)   • Ischemic colitis (HCC)   • Melasma   • Elevated platelet count   • Acute pain of right knee   • Preventative health care        Past Medical History:   Diagnosis Date   • Colitis 07/02/2015    MED RX 07/04/2018   • Drug therapy    • Hodgkin's lymphoma (HCC) 1991    chemo-25 yrs ago    • Hyperlipidemia    • Hypertension        Past Surgical History:   Procedure Laterality Date   • BREAST BIOPSY     • BREAST CYST EXCISION         Family History   Problem Relation Age of Onset   • Breast cancer Maternal Aunt 18   • Lung cancer Mother    • Heart disease Father    • Parkinsonism Father    • Diabetes Father    • Alzheimer's disease Father        Social History     Socioeconomic History   • Marital status:    Tobacco Use   • Smoking status: Never Smoker   • Smokeless tobacco: Never Used   Substance and Sexual Activity   • Alcohol use: No   • Drug use: No       No Known Allergies    Review of Systems     HEENT: Denies any hearing changes, eyesight changes, no headache or dizziness  NECK: Denies any pain, stiffness, swelling or dysphagia  CHEST: Denies cough or wheeze or recent lung infections  CARDIAC: Denies chest pain, pressure or palpitations  ABD: Denies nausea, vomiting or abdominal pain  : Denies dysuria  NEURO: Denies syncope, concussion, neuropathy  PSYCH: Denies any stress  EXTREM: Denies arthritic changes myalgia or arthralgia complains of right knee soreness improved with physical therapy  SKIN: Denies any rash    Vital Signs  Vitals:    07/20/22 1455 07/20/22 1521   BP: 144/90 132/80   BP Location: Left arm    Patient Position: Sitting    Cuff Size: Adult    Pulse: 84    Weight: 73.6 kg (162 lb 3.2 oz)    Height: 157.5 cm (62\")    PainSc: 0-No pain      Body mass index is 29.67 kg/m².  BMI is " >= 25 and <30. (Overweight) The following options were offered after discussion;: exercise counseling/recommendations and nutrition counseling/recommendations     Advance Care Planning          Current Outpatient Medications:   •  ELDERBERRY PO, Take 1 tablet by mouth Daily., Disp: , Rfl:   •  fluticasone (FLONASE) 50 MCG/ACT nasal spray, 2 sprays into the nostril(s) as directed by provider Daily., Disp: , Rfl:   •  lisinopril (PRINIVIL,ZESTRIL) 5 MG tablet, TAKE 1 TABLET BY MOUTH EVERY DAY, Disp: 90 tablet, Rfl: 3  •  Multiple Vitamins-Minerals (MULTIVITAMIN ADULT PO), Take 1 tablet by mouth Daily., Disp: , Rfl:   •  simvastatin (ZOCOR) 40 MG tablet, TAKE 1 TABLET BY MOUTH EVERY DAY IN THE EVENING, Disp: 90 tablet, Rfl: 3  •  Vitamin D, Ergocalciferol, 2000 units capsule, Take 2 capsules by mouth 1 (One) Time Per Week., Disp: , Rfl:     Physical Exam     ACE III MINI         HEENT:  Pupils equal reactive ENT clear, no facial asymmetry, pharynx is clear  NECK: No masses bruit or thyromegaly  CHEST: Clear without rales wheezes or rhonchi  CARDIAC: Regular rhythm without gallop rub or click, blood pressure 118/70 mmHg, heart rate stable  ABD: Liver spleen normal, good bowel sounds, nontender  : Deferred  NEURO: Intact  PSYCH: Normal  EXTREM: No significant arthritic changes, no edema mild arthritis right knee  SKIN: Clear     Results Review:    No results found for this or any previous visit (from the past 672 hour(s)).  Procedures    Medication Review: Medications reviewed and noted    Patient wellness counseling  Ex[facility]: [facility]  Diet: Continue low carbohydrate diet and weight loss  Screening:  Social History     Socioeconomic History   • Marital status:    Tobacco Use   • Smoking status: Never Smoker   • Smokeless tobacco: Never Used   Substance and Sexual Activity   • Alcohol use: No   • Drug use: No    08/03/2020 EKG 12 lead was reviewed with the  patient.    Assessment/Plan:    Problem List Items Addressed This Visit        Allergies and Adverse Reactions    Chronic allergic rhinitis    Overview     Chronic allergic rhinitis with infrequent sinus infections on over-the-counter antihistamine, Flonase as needed and nasal spray of normal saline           Current Assessment & Plan     Chronic allergy control with Flonase and elderberry continue therapy              Cardiac and Vasculature    Essential hypertension - Primary    Overview     Essential hypertension treated with lisinopril 5 mg daily CMP normal           Current Assessment & Plan     The patient's blood pressure is well controlled at this time. She will continue lisinopril 5 mg daily.             Relevant Medications    lisinopril (PRINIVIL,ZESTRIL) 5 MG tablet    Hyperlipidemia    Overview     Mixed  Hyperlipidemia treated with simvastatin 40 mg daily denies myalgia arthralgia           Current Assessment & Plan     The patient will continue simvastatin 40 mg daily.           Relevant Medications    simvastatin (ZOCOR) 40 MG tablet       Endocrine and Metabolic    Vitamin D deficiency    Overview     Vitamin D deficiency on replacement of 4000 units daily, vitamin D levels pending continue therapy           Current Assessment & Plan     The patient will continue vitamin D 4000 units daily.                Musculoskeletal and Injuries    Acute pain of right knee    Overview     Voltaren gel apply twice daily  Xray today  Referral to [facility]    Discussed possible referral to orthopedics if xray shows significant degenerative changes.            Current Assessment & Plan     Right knee pain from degenerative arthritis disease improved with physical therapy she is now exercising on a regular basis 4 days/week at the  continue weight loss and physical exercise                  Patient Instructions   Fasting lab drawn this afternoon is pending  Continue all current therapy  Continue  healthy cardiac diet and weight loss  [facility]  Return visit in 6 months or as needed       Plan of care reviewed with patient at the conclusion of today's visit. Education was provided regarding diagnosis, management, and any prescribed or recommended OTC medications.Patient verbalizes understanding of and agreement with management plan.         Kimani Adams MD      Note: Part of this note may be an electronic transcription/translation of spoken language to printed text using the Dragon Dictation system.      Answers for HPI/ROS submitted by the patient on 7/13/2022  What is the primary reason for your visit?: Physical    Transcribed from ambient dictation for Kimani Adams MD by Sharmila Wong.  07/20/22   16:27 EDT    Patient verbalized consent to the visit recording.  I have personally performed the services described in this document as transcribed by the above individual, and it is both accurate and complete.  Kimani Adams MD  7/20/2022  18:42 EDT

## 2022-07-21 LAB
25(OH)D3 SERPL-MCNC: 22.9 NG/ML (ref 30–100)
ALBUMIN SERPL-MCNC: 4.9 G/DL (ref 3.5–5.2)
ALBUMIN/GLOB SERPL: 2.3 G/DL
ALP SERPL-CCNC: 45 U/L (ref 39–117)
ALT SERPL W P-5'-P-CCNC: 27 U/L (ref 1–33)
ANION GAP SERPL CALCULATED.3IONS-SCNC: 10.2 MMOL/L (ref 5–15)
AST SERPL-CCNC: 28 U/L (ref 1–32)
BASOPHILS # BLD AUTO: 0.04 10*3/MM3 (ref 0–0.2)
BASOPHILS NFR BLD AUTO: 0.7 % (ref 0–1.5)
BILIRUB SERPL-MCNC: 0.8 MG/DL (ref 0–1.2)
BUN SERPL-MCNC: 19 MG/DL (ref 8–23)
BUN/CREAT SERPL: 24.7 (ref 7–25)
CALCIUM SPEC-SCNC: 10.3 MG/DL (ref 8.6–10.5)
CHLORIDE SERPL-SCNC: 103 MMOL/L (ref 98–107)
CHOLEST SERPL-MCNC: 158 MG/DL (ref 0–200)
CO2 SERPL-SCNC: 24.8 MMOL/L (ref 22–29)
CREAT SERPL-MCNC: 0.77 MG/DL (ref 0.57–1)
DEPRECATED RDW RBC AUTO: 40.7 FL (ref 37–54)
EGFRCR SERPLBLD CKD-EPI 2021: 87.9 ML/MIN/1.73
EOSINOPHIL # BLD AUTO: 0.16 10*3/MM3 (ref 0–0.4)
EOSINOPHIL NFR BLD AUTO: 2.8 % (ref 0.3–6.2)
ERYTHROCYTE [DISTWIDTH] IN BLOOD BY AUTOMATED COUNT: 12.7 % (ref 12.3–15.4)
GLOBULIN UR ELPH-MCNC: 2.1 GM/DL
GLUCOSE SERPL-MCNC: 88 MG/DL (ref 65–99)
HCT VFR BLD AUTO: 42.9 % (ref 34–46.6)
HDLC SERPL-MCNC: 56 MG/DL (ref 40–60)
HGB BLD-MCNC: 14.4 G/DL (ref 12–15.9)
IMM GRANULOCYTES # BLD AUTO: 0.02 10*3/MM3 (ref 0–0.05)
IMM GRANULOCYTES NFR BLD AUTO: 0.3 % (ref 0–0.5)
LDLC SERPL CALC-MCNC: 86 MG/DL (ref 0–100)
LDLC/HDLC SERPL: 1.52 {RATIO}
LYMPHOCYTES # BLD AUTO: 1.29 10*3/MM3 (ref 0.7–3.1)
LYMPHOCYTES NFR BLD AUTO: 22.4 % (ref 19.6–45.3)
MCH RBC QN AUTO: 30 PG (ref 26.6–33)
MCHC RBC AUTO-ENTMCNC: 33.6 G/DL (ref 31.5–35.7)
MCV RBC AUTO: 89.4 FL (ref 79–97)
MONOCYTES # BLD AUTO: 0.46 10*3/MM3 (ref 0.1–0.9)
MONOCYTES NFR BLD AUTO: 8 % (ref 5–12)
NEUTROPHILS NFR BLD AUTO: 3.8 10*3/MM3 (ref 1.7–7)
NEUTROPHILS NFR BLD AUTO: 65.8 % (ref 42.7–76)
NRBC BLD AUTO-RTO: 0 /100 WBC (ref 0–0.2)
PLATELET # BLD AUTO: 336 10*3/MM3 (ref 140–450)
PMV BLD AUTO: 9.2 FL (ref 6–12)
POTASSIUM SERPL-SCNC: 4.1 MMOL/L (ref 3.5–5.2)
PROT SERPL-MCNC: 7 G/DL (ref 6–8.5)
RBC # BLD AUTO: 4.8 10*6/MM3 (ref 3.77–5.28)
SODIUM SERPL-SCNC: 138 MMOL/L (ref 136–145)
TRIGL SERPL-MCNC: 85 MG/DL (ref 0–150)
VLDLC SERPL-MCNC: 16 MG/DL (ref 5–40)
WBC NRBC COR # BLD: 5.77 10*3/MM3 (ref 3.4–10.8)

## 2022-08-15 RX ORDER — CHOLECALCIFEROL (VITAMIN D3) 1250 MCG
CAPSULE ORAL
Qty: 5 CAPSULE | Refills: 0 | Status: SHIPPED | OUTPATIENT
Start: 2022-08-15

## 2022-08-15 NOTE — TELEPHONE ENCOUNTER
Rx Refill Note  Requested Prescriptions     Pending Prescriptions Disp Refills   • Cholecalciferol (Vitamin D3) 1.25 MG (37173 UT) capsule [Pharmacy Med Name: VITAMIN D3 1,250 MCG CAPSULE] 5 capsule      Sig: TAKE 1 TABLET BY MOUTH EVERY 7 (SEVEN) DAYS FOR 5 WEEKS      Last office visit with prescribing clinician: 7/20/2022      Next office visit with prescribing clinician: 1/4/2023            Mahi Horton LPN  08/15/22, 09:16 EDT

## 2022-08-22 ENCOUNTER — TELEPHONE (OUTPATIENT)
Dept: INTERNAL MEDICINE | Facility: CLINIC | Age: 62
End: 2022-08-22

## 2022-08-22 NOTE — TELEPHONE ENCOUNTER
Caller: Hiwot Ren    Relationship: Self    Best call back number: [phone]- LEAVE DETAILED MESSAGE IF NO ANSWER. AFTER 3:30PM PLEASE    What is the best time to reach you: ANY    Who are you requesting to speak with (clinical staff, provider,  specific staff member): ANY    What was the call regarding: WAS TOLD BY  THAT THE APPOINTMENT WOULD BE CODED AS PHYSICAL- PATIENT WAS BILLED AS OFFICE VISIT AND CHARGED A $30 CO-PAY. ASKING THIS TO BE CORRECTED. ALSO HAD LABS DRAWN FOR THE PHYSICAL- ASKING THIS TO BE CORRECTED AS WELL.    Do you require a callback: YES, PLEASE CALL AFTER 3:30 PM. IF NO ANSWER LEAVE DETAILED MESSAGE

## 2022-08-22 NOTE — TELEPHONE ENCOUNTER
Left  Mikal a VM explaining the one yr restrictions on a physical & that her labs were coded appropriately & the same as in past.

## 2022-08-22 NOTE — TELEPHONE ENCOUNTER
Her 7/20 visit was scheduled by her via [employer].  She was not due for a physical at that time as she had one in Nov.

## 2022-08-22 NOTE — TELEPHONE ENCOUNTER
Labs were coded exactly the same as they were at her Oct physical.  Diagnostic as she has medical conditions that warrant lab work.

## 2022-10-06 RX ORDER — LISINOPRIL 5 MG/1
TABLET ORAL
Qty: 90 TABLET | Refills: 3 | Status: SHIPPED | OUTPATIENT
Start: 2022-10-06

## 2022-10-10 RX ORDER — AZITHROMYCIN 250 MG/1
TABLET, FILM COATED ORAL
Qty: 6 TABLET | Refills: 0 | Status: SHIPPED | OUTPATIENT
Start: 2022-10-10 | End: 2023-01-04

## 2022-11-16 NOTE — ASSESSMENT & PLAN NOTE
Essential hypertension on lisinopril 5 mg daily denies headache or hyperactive airway cough current blood pressure 122/78 left and right sitting, CMP is pending continue therapy  
  Mixed hyperlipidemia on simvastatin 40 mg daily denies myalgia arthralgia fasting CMP and lipid are pending continue therapy and encouraged healthy cardiac diet and weight loss  
Vitamin D level pending continue 4000 units daily  
Additional Notes: Patient consent was obtained to proceed with the visit and recommended plan of care after discussion of all risks and benefits, including the risks of COVID-19 exposure.
Detail Level: Simple

## 2022-12-07 ENCOUNTER — TRANSCRIBE ORDERS (OUTPATIENT)
Dept: ADMINISTRATIVE | Facility: HOSPITAL | Age: 62
End: 2022-12-07

## 2022-12-07 DIAGNOSIS — Z12.31 SCREENING MAMMOGRAM FOR BREAST CANCER: Primary | ICD-10-CM

## 2023-01-04 ENCOUNTER — OFFICE VISIT (OUTPATIENT)
Dept: INTERNAL MEDICINE | Facility: CLINIC | Age: 63
End: 2023-01-04
Payer: COMMERCIAL

## 2023-01-04 ENCOUNTER — LAB (OUTPATIENT)
Dept: LAB | Facility: HOSPITAL | Age: 63
End: 2023-01-04
Payer: COMMERCIAL

## 2023-01-04 VITALS
WEIGHT: 164.4 LBS | DIASTOLIC BLOOD PRESSURE: 76 MMHG | BODY MASS INDEX: 30.25 KG/M2 | SYSTOLIC BLOOD PRESSURE: 124 MMHG | HEART RATE: 90 BPM | HEIGHT: 62 IN

## 2023-01-04 DIAGNOSIS — C81.98 HODGKIN LYMPHOMA OF LYMPH NODES OF MULTIPLE REGIONS, UNSPECIFIED HODGKIN LYMPHOMA TYPE: ICD-10-CM

## 2023-01-04 DIAGNOSIS — E78.2 MIXED HYPERLIPIDEMIA: ICD-10-CM

## 2023-01-04 DIAGNOSIS — Z00.00 PREVENTATIVE HEALTH CARE: ICD-10-CM

## 2023-01-04 DIAGNOSIS — Z00.00 PREVENTATIVE HEALTH CARE: Primary | ICD-10-CM

## 2023-01-04 DIAGNOSIS — E55.9 VITAMIN D DEFICIENCY: ICD-10-CM

## 2023-01-04 DIAGNOSIS — I10 ESSENTIAL HYPERTENSION: ICD-10-CM

## 2023-01-04 DIAGNOSIS — M25.561 ACUTE PAIN OF RIGHT KNEE: ICD-10-CM

## 2023-01-04 PROCEDURE — 80061 LIPID PANEL: CPT

## 2023-01-04 PROCEDURE — 80050 GENERAL HEALTH PANEL: CPT

## 2023-01-04 PROCEDURE — 99214 OFFICE O/P EST MOD 30 MIN: CPT | Performed by: INTERNAL MEDICINE

## 2023-01-04 PROCEDURE — 93000 ELECTROCARDIOGRAM COMPLETE: CPT | Performed by: INTERNAL MEDICINE

## 2023-01-04 PROCEDURE — 82306 VITAMIN D 25 HYDROXY: CPT

## 2023-01-04 PROCEDURE — 99396 PREV VISIT EST AGE 40-64: CPT | Performed by: INTERNAL MEDICINE

## 2023-01-04 PROCEDURE — 91312 COVID-19 (PFIZER) BIVALENT BOOSTER 12+YRS: CPT | Performed by: INTERNAL MEDICINE

## 2023-01-04 PROCEDURE — 0124A PR ADM SARSCOV2 30MCG/0.3ML BST: CPT | Performed by: INTERNAL MEDICINE

## 2023-01-04 NOTE — ASSESSMENT & PLAN NOTE
Mixed hyperlipidemia on simvastatin 40 mg daily denies myalgia arthralgia patient is overweight with current weight of 164 up 2 pounds with a BMI of 30.07 encouraged healthy cardiac diet with reduced cholesterol and weight loss CMP and lipid are pending continue diet

## 2023-01-04 NOTE — ASSESSMENT & PLAN NOTE
Essential hypertension with blood pressure 124/76 left and right on lisinopril 5 mg daily denies headache denies cough CMP is pending EKG shows left atrial enlargement and possible right atrial enlargement no significant change from August 3, 2020 continue therapy

## 2023-01-04 NOTE — ASSESSMENT & PLAN NOTE
Mild degenerative changes of the right knee with discomfort with long periods of standing and walking steps continue Tylenol as needed elastic brace while working

## 2023-01-04 NOTE — PATIENT INSTRUCTIONS
Fasting lab of CBC CMP lipid TSH and vitamin D levels are pending  EKG discussed with left atrial enlargement changed and possible right atrial enlargement  Continue all current medications  Continue exercise  Suggest wearing elastic brace right knee 1 exercise walking  Encouraged healthy cardiac diet and weight loss  Check with mammogram scheduling and extend visit to March as she had a COVID booster this day  Return visit in 6 months or as needed

## 2023-01-04 NOTE — PROGRESS NOTES
[facility]     Hiwot Ren  1960   [id number]      Patient Care Team:  Kimani Adams MD as PCP - General (Internal Medicine)    Chief Complaint::   Chief Complaint   Patient presents with   • Annual Exam     Patient is fasting            HPI  The patient is a 62-year-old female who presents for an annual preventative visit and physical examination with a history of essential hypertension, mixed hyperlipidemia, and remote [place] disease treated with chemotherapy in 1990.    The patient reports she is doing well overall. She denies any changes in her medications, diet, activity, or sleep. She states she is going to the gym and trying to lose weight. The patient denies any recent headache, dizziness, or allergy drainage. She denies any difficulty with hearing or vision. The patient denies any sore throat or recent respiratory infections. She denies any difficulty swallowing. The patient denies any coughing, wheezing, or shortness of breath. The patient notes she takes Zyrtec-D for her seasonal allergies. She denies any chest pain, pressure, heart skipping, or palpitations. The patient denies any breast pain or soreness. She denies any stomach trouble, nausea, vomiting, constipation, or diarrhea. The patient denies any changes in her bowel habits. She denies any changes in her bowel habits. The patient states she has a mammogram scheduled for 02/2023. She states she would like to get the COVID-19 vaccine. The patient states she had a colonoscopy in 06/2018, which was normal. She confirms she was told to follow up in 5 years. The patient states she has never had colon polyps. She states she did a Cologuard sample, which was positive. The patient states she has been taking her vitamin D more often on, Monday, Wednesday, and Friday. She states she has always had low vitamin D levels.    The patient states she has been working hard at [employer]. 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 The patient states she is on her feet most of the day. She states she tries to walk 2 miles at the gym. The patient states she would like to be under 160 pounds. She states she was tested for glaucoma because it runs in her family. The patient states her grandmother had glaucoma.     The patient confirms she has had intermittent pain in her right knee. She states she takes Aleve, Advil, or Tylenol for her knee pain. The patient states her knee does not give way. The patient confirms mild soreness in her right knee. She states she was wearing a brace when she initially hurt her knee but she does not currently wear a brace.       Patient Active Problem List   Diagnosis   • Colitis   • Essential hypertension   • Hyperlipidemia   • Dehydration   • Leukocytosis   • Lower GI bleed   • Chronic allergic rhinitis   • Vitamin D deficiency   • Otitis   • History of Hodgkin's disease   • Hodgkin's disease (HCC)   • Ischemic colitis (HCC)   • Melasma   • Elevated platelet count   • Acute pain of right knee   • Preventative health care   • Preventative health care        Past Medical History:   Diagnosis Date   • Colitis 07/02/2015    MED RX 07/04/2018   • Drug therapy    • Hodgkin's lymphoma (HCC) 1991    chemo-25 yrs ago    • Hyperlipidemia    • Hypertension        Past Surgical History:   Procedure Laterality Date   • BREAST BIOPSY     • BREAST CYST EXCISION         Family History   Problem Relation Age of Onset   • Breast cancer Maternal Aunt 18   • Lung cancer Mother    • Heart disease Father    • Parkinsonism Father    • Diabetes Father    • Alzheimer's disease Father        Social History     Socioeconomic History   • Marital status:    Tobacco Use   • Smoking status: Never   • Smokeless tobacco: Never   Substance and Sexual Activity   • Alcohol use: No   • Drug use: No       No Known Allergies    Immunization History   Administered Date(s) Administered   • COVID-19 (PFIZER) BIVALENT BOOSTER  12+YRS 01/04/2023   • COVID-19 (PFIZER) PURPLE CAP 03/13/2021, 04/02/2021, 12/29/2021   • FluLaval/Fluzone >6mos 10/22/2020   • Tdap 01/27/2016        Health Maintenance Due   Topic Date Due   • Pneumococcal Vaccine 0-64 (1 - PCV) Never done   • ZOSTER VACCINE (1 of 2) Never done   • HEPATITIS C SCREENING  Never done   • PAP SMEAR  Never done   • MAMMOGRAM  02/22/2019        Review of Systems     HEENT: Denies any hearing changes, eyesight changes, no headache or dizziness  NECK: Denies any pain, stiffness, swelling or dysphagia  CHEST: Denies cough or wheeze or recent lung infections  CARDIAC: Denies chest pain, pressure or palpitations  ABD: Denies nausea, vomiting or abdominal pain  : Denies dysuria  NEURO: Denies syncope, concussion, neuropathy  PSYCH: Denies any stress  EXTREM: Denies arthritic changes myalgia or arthralgia complains of right knee pain with mild degenerative arthritis tickly when standing for long periods or climbing up and down steps  SKIN: Denies any rash    Vital Signs  Vitals:    01/04/23 0955 01/04/23 1020   BP: 142/90 124/76   BP Location: Left arm    Patient Position: Sitting    Cuff Size: Adult    Pulse: 90    Weight: 74.6 kg (164 lb 6.4 oz)    Height: 157.5 cm (62\")    PainSc: 0-No pain      Body mass index is 30.07 kg/m².  BMI is >= 30 and <35. (Class 1 Obesity). The following options were offered after discussion;: exercise counseling/recommendations and nutrition counseling/recommendations     Advance Care Planning         Current Outpatient Medications:   •  Cholecalciferol (Vitamin D3) 1.25 MG (28527 UT) capsule, TAKE 1 TABLET BY MOUTH EVERY 7 (SEVEN) DAYS FOR 5 WEEKS, Disp: 5 capsule, Rfl: 0  •  ELDERBERRY PO, Take 1 tablet by mouth Daily., Disp: , Rfl:   •  lisinopril (PRINIVIL,ZESTRIL) 5 MG tablet, TAKE 1 TABLET BY MOUTH EVERY DAY, Disp: 90 tablet, Rfl: 3  •  Multiple Vitamins-Minerals (MULTIVITAMIN ADULT PO), Take 1 tablet by mouth Daily., Disp: , Rfl:   •  simvastatin  (ZOCOR) 40 MG tablet, TAKE 1 TABLET BY MOUTH EVERY DAY IN THE EVENING, Disp: 90 tablet, Rfl: 3    Physical Exam     HEENT: Pupils equal reactive ENT clear, no facial asymmetry, the pharynx is clear  NECK: No masses, bruit or thyromegaly  CHEST: Clear without rales wheezes or rhonchi  CARDIAC: Regular rhythm without gallop rub or click, blood pressure heart rate stable  ABD: Liver spleen normal, good bowel sounds, nontender  : Deferred  NEURO: Intact  PSYCH: Normal  EXTREM: No significant arthritic changes, no edema mild swelling and fluid right knee with no grinding some minor instability no edema  SKIN: Clear     Results Review:    EKG done fasting labs CBC CMP lipid TSH and vitamin D levels are pain    ECG 12 Lead    Date/Time: 1/4/2023 6:09 PM  Performed by: Kimani Adams MD  Authorized by: Kimani Adams MD   Comparison: compared with previous ECG from 8/3/2020  Similar to previous ECG  Comparison to previous ECG: Current EKG shows sinus rhythm with left atrial enlargement and possible right atrial enlargement similar to EKG of August 3, 2020  Rhythm: sinus rhythm  Rate: normal  Conduction: conduction normal  ST Segments: ST segments normal  T Waves: T waves normal  QRS axis: normal  Other findings: left atrial abnormality    Clinical impression: abnormal EKG  Comments: Current EKG shows sinus rhythm with left atrial enlargement and possible right atrial enlargement similar to EKG of August 3, 2020 asymptomatic        Patient Wellness Counseling:   Plan of care reviewed with patient at the conclusion of today's visit. Education was provided in regards to diagnosis, diet and exercise, cervical cancer screening, self breast exams, breast cancer screening, and the importance of yearly mammograms.   Nutrition, family planning/contraception, physical activity, healthy weight,ways to reduce stress, adequate sleep, injury prevention, misuse of tobacco, alcohol and drugs, sexual behavior and STD's, dental health,  mental health, and immunizations.    Management and any prescribed or recommended OTC medications.  Patient verbalizes understanding of and agreement with management plan.    Medication Review: Medications reviewed and noted    Patient wellness counseling  Exercise: Encouraged walking exercise  Diet: Encouraged healthy cardiac diet reduce carbs more portions weight loss  Screening: Fasting CBC CMP lipid TSH and vitamin D levels pending  Social History     Socioeconomic History   • Marital status:    Tobacco Use   • Smoking status: Never   • Smokeless tobacco: Never   Substance and Sexual Activity   • Alcohol use: No   • Drug use: No        Assessment/Plan:  Problem List Items Addressed This Visit        Cardiac and Vasculature    Essential hypertension    Overview     Essential hypertension treated with lisinopril 5 mg daily CMP normal         Current Assessment & Plan       Essential hypertension with blood pressure 124/76 left and right on lisinopril 5 mg daily denies headache denies cough CMP is pending EKG shows left atrial enlargement and possible right atrial enlargement no significant change from August 3, 2020 continue therapy         Relevant Medications    lisinopril (PRINIVIL,ZESTRIL) 5 MG tablet    Other Relevant Orders    CBC & Differential    Comprehensive Metabolic Panel    Lipid Panel    TSH    Vitamin D,25-Hydroxy    ECG 12 Lead    Hyperlipidemia    Overview     Mixed  Hyperlipidemia treated with simvastatin 40 mg daily denies myalgia arthralgia         Current Assessment & Plan       Mixed hyperlipidemia on simvastatin 40 mg daily denies myalgia arthralgia patient is overweight with current weight of 164 up 2 pounds with a BMI of 30.07 encouraged healthy cardiac diet with reduced cholesterol and weight loss CMP and lipid are pending continue diet         Relevant Medications    simvastatin (ZOCOR) 40 MG tablet    Other Relevant Orders    CBC & Differential    Comprehensive Metabolic Panel     Lipid Panel    TSH    Vitamin D,25-Hydroxy       Endocrine and Metabolic    Vitamin D deficiency    Overview     Vitamin D deficiency on replacement of 4000 units daily, vitamin D levels pending continue therapy         Current Assessment & Plan     Vitamin D level pending taking 4000 units every week continue therapy pending lab results         Relevant Orders    CBC & Differential    Comprehensive Metabolic Panel    Lipid Panel    TSH    Vitamin D,25-Hydroxy       Health Encounters    Preventative health care - Primary    Overview     Patient 60-year-old female presents for annual preventative visit and physical examination         Relevant Orders    CBC & Differential    Comprehensive Metabolic Panel    Lipid Panel    TSH    Vitamin D,25-Hydroxy       Hematology and Neoplasia    Hodgkin's disease (HCC)    Overview     Unspecified type  Treated with chemotherapy 1990         Current Assessment & Plan     Hodgkin's disease treated with chemotherapy 1990 with no evidence of recurrence         Relevant Orders    CBC & Differential    Comprehensive Metabolic Panel    Lipid Panel    TSH    Vitamin D,25-Hydroxy       Musculoskeletal and Injuries    Acute pain of right knee    Overview     Voltaren gel apply twice daily  Xray today  Referral to [facility]    Discussed possible referral to orthopedics if xray shows significant degenerative changes.          Current Assessment & Plan     Mild degenerative changes of the right knee with discomfort with long periods of standing and walking steps continue Tylenol as needed elastic brace while working             Patient Instructions   Fasting lab of CBC CMP lipid TSH and vitamin D levels are pending  EKG discussed with left atrial enlargement changed and possible right atrial enlargement  Continue all current medications  Continue exercise  Suggest wearing elastic brace right knee 1 exercise walking  Encouraged healthy cardiac diet and weight loss  Check  with mammogram scheduling and extend visit to March as she had a COVID booster this day  Return visit in 6 months or as needed       CMP:  Lab Results   Component Value Date    BUN 19 07/20/2022    CREATININE 0.77 07/20/2022    EGFRIFNONA 71 10/18/2021    BCR 24.7 07/20/2022     07/20/2022    K 4.1 07/20/2022    CO2 24.8 07/20/2022    CALCIUM 10.3 07/20/2022    ALBUMIN 4.90 07/20/2022    BILITOT 0.8 07/20/2022    ALKPHOS 45 07/20/2022    AST 28 07/20/2022    ALT 27 07/20/2022     HbA1c:  No results found for: HGBA1C  Microalbumin:  No results found for: MICROALBUR, POCMALB, POCCREAT  Lipid Panel  Lab Results   Component Value Date    CHOL 158 07/20/2022    TRIG 85 07/20/2022    HDL 56 07/20/2022    LDL 86 07/20/2022    AST 28 07/20/2022    ALT 27 07/20/2022        Counseling was given to patient for the following topics: disease prevention.    Assessment/Plan:    1. Essential hypertension:  -The patient will have laboratory work done today.     2. Right Knee Arthritis:  - The patient is advised to take Advil, Aleve, or Tylenol to help provide relief for her knee pain.  - The patient is advised to wear an elastic brace for added support.    Plan of care reviewed with patient at the conclusion of today's visit. Education was provided regarding diagnosis, management, and any prescribed or recommended OTC medications.Patient verbalizes understanding of and agreement with management plan.     The patient is advised to follow up in 6 months or as needed.     Transcribed from ambient dictation for Kimani Adams MD by Wilma Nolan.  01/04/23   12:01 EST    Patient or patient representative verbalized consent to the visit recording.  I have personally performed the services described in this document as transcribed by the above individual, and it is both accurate and complete.  Kimani Adams MD  1/4/2023  18:11 EST

## 2023-01-05 LAB
25(OH)D3 SERPL-MCNC: 34.7 NG/ML (ref 30–100)
ALBUMIN SERPL-MCNC: 4.4 G/DL (ref 3.5–5.2)
ALBUMIN/GLOB SERPL: 1.8 G/DL
ALP SERPL-CCNC: 45 U/L (ref 39–117)
ALT SERPL W P-5'-P-CCNC: 21 U/L (ref 1–33)
ANION GAP SERPL CALCULATED.3IONS-SCNC: 10 MMOL/L (ref 5–15)
AST SERPL-CCNC: 27 U/L (ref 1–32)
BASOPHILS # BLD AUTO: 0.04 10*3/MM3 (ref 0–0.2)
BASOPHILS NFR BLD AUTO: 0.9 % (ref 0–1.5)
BILIRUB SERPL-MCNC: 0.5 MG/DL (ref 0–1.2)
BUN SERPL-MCNC: 16 MG/DL (ref 8–23)
BUN/CREAT SERPL: 19.3 (ref 7–25)
CALCIUM SPEC-SCNC: 9.9 MG/DL (ref 8.6–10.5)
CHLORIDE SERPL-SCNC: 102 MMOL/L (ref 98–107)
CHOLEST SERPL-MCNC: 164 MG/DL (ref 0–200)
CO2 SERPL-SCNC: 27 MMOL/L (ref 22–29)
CREAT SERPL-MCNC: 0.83 MG/DL (ref 0.57–1)
DEPRECATED RDW RBC AUTO: 40.2 FL (ref 37–54)
EGFRCR SERPLBLD CKD-EPI 2021: 79.8 ML/MIN/1.73
EOSINOPHIL # BLD AUTO: 0.12 10*3/MM3 (ref 0–0.4)
EOSINOPHIL NFR BLD AUTO: 2.6 % (ref 0.3–6.2)
ERYTHROCYTE [DISTWIDTH] IN BLOOD BY AUTOMATED COUNT: 12.3 % (ref 12.3–15.4)
GLOBULIN UR ELPH-MCNC: 2.5 GM/DL
GLUCOSE SERPL-MCNC: 95 MG/DL (ref 65–99)
HCT VFR BLD AUTO: 42.7 % (ref 34–46.6)
HDLC SERPL-MCNC: 56 MG/DL (ref 40–60)
HGB BLD-MCNC: 14.1 G/DL (ref 12–15.9)
IMM GRANULOCYTES # BLD AUTO: 0.01 10*3/MM3 (ref 0–0.05)
IMM GRANULOCYTES NFR BLD AUTO: 0.2 % (ref 0–0.5)
LDLC SERPL CALC-MCNC: 92 MG/DL (ref 0–100)
LDLC/HDLC SERPL: 1.63 {RATIO}
LYMPHOCYTES # BLD AUTO: 1.01 10*3/MM3 (ref 0.7–3.1)
LYMPHOCYTES NFR BLD AUTO: 22.2 % (ref 19.6–45.3)
MCH RBC QN AUTO: 29.7 PG (ref 26.6–33)
MCHC RBC AUTO-ENTMCNC: 33 G/DL (ref 31.5–35.7)
MCV RBC AUTO: 90.1 FL (ref 79–97)
MONOCYTES # BLD AUTO: 0.46 10*3/MM3 (ref 0.1–0.9)
MONOCYTES NFR BLD AUTO: 10.1 % (ref 5–12)
NEUTROPHILS NFR BLD AUTO: 2.9 10*3/MM3 (ref 1.7–7)
NEUTROPHILS NFR BLD AUTO: 64 % (ref 42.7–76)
NRBC BLD AUTO-RTO: 0 /100 WBC (ref 0–0.2)
PLATELET # BLD AUTO: 342 10*3/MM3 (ref 140–450)
PMV BLD AUTO: 8.9 FL (ref 6–12)
POTASSIUM SERPL-SCNC: 4.1 MMOL/L (ref 3.5–5.2)
PROT SERPL-MCNC: 6.9 G/DL (ref 6–8.5)
RBC # BLD AUTO: 4.74 10*6/MM3 (ref 3.77–5.28)
SODIUM SERPL-SCNC: 139 MMOL/L (ref 136–145)
TRIGL SERPL-MCNC: 83 MG/DL (ref 0–150)
TSH SERPL DL<=0.05 MIU/L-ACNC: 2.33 UIU/ML (ref 0.27–4.2)
VLDLC SERPL-MCNC: 16 MG/DL (ref 5–40)
WBC NRBC COR # BLD: 4.54 10*3/MM3 (ref 3.4–10.8)

## 2023-01-09 RX ORDER — AMOXICILLIN AND CLAVULANATE POTASSIUM 500; 125 MG/1; MG/1
1 TABLET, FILM COATED ORAL 2 TIMES DAILY
Qty: 16 TABLET | Refills: 0 | Status: SHIPPED | OUTPATIENT
Start: 2023-01-09

## 2023-02-08 ENCOUNTER — APPOINTMENT (OUTPATIENT)
Dept: MAMMOGRAPHY | Facility: HOSPITAL | Age: 63
End: 2023-02-08
Payer: COMMERCIAL

## 2023-03-02 RX ORDER — AZITHROMYCIN 250 MG/1
TABLET, FILM COATED ORAL
Qty: 6 TABLET | Refills: 0 | Status: SHIPPED | OUTPATIENT
Start: 2023-03-02

## 2023-04-06 RX ORDER — SIMVASTATIN 40 MG
TABLET ORAL
Qty: 90 TABLET | Refills: 3 | Status: SHIPPED | OUTPATIENT
Start: 2023-04-06

## 2023-04-24 ENCOUNTER — HOSPITAL ENCOUNTER (OUTPATIENT)
Dept: MAMMOGRAPHY | Facility: HOSPITAL | Age: 63
Discharge: HOME | End: 2023-04-24
Admitting: INTERNAL MEDICINE
Payer: COMMERCIAL

## 2023-04-24 DIAGNOSIS — Z12.31 SCREENING MAMMOGRAM FOR BREAST CANCER: ICD-10-CM

## 2023-04-24 PROCEDURE — 77063 BREAST TOMOSYNTHESIS BI: CPT

## 2023-04-24 PROCEDURE — 77067 SCR MAMMO BI INCL CAD: CPT

## 2023-07-24 ENCOUNTER — LAB (OUTPATIENT)
Dept: LAB | Facility: HOSPITAL | Age: 63
End: 2023-07-24
Payer: COMMERCIAL

## 2023-07-24 DIAGNOSIS — I10 ESSENTIAL HYPERTENSION: ICD-10-CM

## 2023-07-24 DIAGNOSIS — E55.9 VITAMIN D DEFICIENCY: ICD-10-CM

## 2023-07-24 DIAGNOSIS — E78.2 MIXED HYPERLIPIDEMIA: ICD-10-CM

## 2023-07-24 LAB
ALBUMIN SERPL-MCNC: 4.6 G/DL (ref 3.5–5.2)
ALBUMIN/GLOB SERPL: 1.7 G/DL
ALP SERPL-CCNC: 46 U/L (ref 39–117)
ALT SERPL W P-5'-P-CCNC: 23 U/L (ref 1–33)
ANION GAP SERPL CALCULATED.3IONS-SCNC: 10.9 MMOL/L (ref 5–15)
AST SERPL-CCNC: 31 U/L (ref 1–32)
BASOPHILS # BLD AUTO: 0.03 10*3/MM3 (ref 0–0.2)
BASOPHILS NFR BLD AUTO: 0.8 % (ref 0–1.5)
BILIRUB SERPL-MCNC: 0.9 MG/DL (ref 0–1.2)
BUN SERPL-MCNC: 18 MG/DL (ref 8–23)
BUN/CREAT SERPL: 21.2 (ref 7–25)
CALCIUM SPEC-SCNC: 10.4 MG/DL (ref 8.6–10.5)
CHLORIDE SERPL-SCNC: 101 MMOL/L (ref 98–107)
CHOLEST SERPL-MCNC: 181 MG/DL (ref 0–200)
CO2 SERPL-SCNC: 26.1 MMOL/L (ref 22–29)
CREAT SERPL-MCNC: 0.85 MG/DL (ref 0.57–1)
DEPRECATED RDW RBC AUTO: 40.9 FL (ref 37–54)
EGFRCR SERPLBLD CKD-EPI 2021: 77.6 ML/MIN/1.73
EOSINOPHIL # BLD AUTO: 0.11 10*3/MM3 (ref 0–0.4)
EOSINOPHIL NFR BLD AUTO: 2.8 % (ref 0.3–6.2)
ERYTHROCYTE [DISTWIDTH] IN BLOOD BY AUTOMATED COUNT: 12.6 % (ref 12.3–15.4)
GLOBULIN UR ELPH-MCNC: 2.7 GM/DL
GLUCOSE SERPL-MCNC: 94 MG/DL (ref 65–99)
HCT VFR BLD AUTO: 44.1 % (ref 34–46.6)
HDLC SERPL-MCNC: 54 MG/DL (ref 40–60)
HGB BLD-MCNC: 14.9 G/DL (ref 12–15.9)
IMM GRANULOCYTES # BLD AUTO: 0 10*3/MM3 (ref 0–0.05)
IMM GRANULOCYTES NFR BLD AUTO: 0 % (ref 0–0.5)
LDLC SERPL CALC-MCNC: 107 MG/DL (ref 0–100)
LDLC/HDLC SERPL: 1.94 {RATIO}
LYMPHOCYTES # BLD AUTO: 1.09 10*3/MM3 (ref 0.7–3.1)
LYMPHOCYTES NFR BLD AUTO: 27.9 % (ref 19.6–45.3)
MCH RBC QN AUTO: 30.2 PG (ref 26.6–33)
MCHC RBC AUTO-ENTMCNC: 33.8 G/DL (ref 31.5–35.7)
MCV RBC AUTO: 89.3 FL (ref 79–97)
MONOCYTES # BLD AUTO: 0.41 10*3/MM3 (ref 0.1–0.9)
MONOCYTES NFR BLD AUTO: 10.5 % (ref 5–12)
NEUTROPHILS NFR BLD AUTO: 2.27 10*3/MM3 (ref 1.7–7)
NEUTROPHILS NFR BLD AUTO: 58 % (ref 42.7–76)
NRBC BLD AUTO-RTO: 0 /100 WBC (ref 0–0.2)
PLATELET # BLD AUTO: 316 10*3/MM3 (ref 140–450)
PMV BLD AUTO: 9.1 FL (ref 6–12)
POTASSIUM SERPL-SCNC: 4.7 MMOL/L (ref 3.5–5.2)
PROT SERPL-MCNC: 7.3 G/DL (ref 6–8.5)
RBC # BLD AUTO: 4.94 10*6/MM3 (ref 3.77–5.28)
SODIUM SERPL-SCNC: 138 MMOL/L (ref 136–145)
TRIGL SERPL-MCNC: 111 MG/DL (ref 0–150)
TSH SERPL DL<=0.05 MIU/L-ACNC: 1.64 UIU/ML (ref 0.27–4.2)
VLDLC SERPL-MCNC: 20 MG/DL (ref 5–40)
WBC NRBC COR # BLD: 3.91 10*3/MM3 (ref 3.4–10.8)

## 2023-07-24 PROCEDURE — 82306 VITAMIN D 25 HYDROXY: CPT

## 2023-07-24 PROCEDURE — 80061 LIPID PANEL: CPT

## 2023-07-24 PROCEDURE — 80050 GENERAL HEALTH PANEL: CPT

## 2023-07-25 ENCOUNTER — OFFICE VISIT (OUTPATIENT)
Dept: INTERNAL MEDICINE | Facility: CLINIC | Age: 63
End: 2023-07-25
Payer: COMMERCIAL

## 2023-07-25 VITALS
DIASTOLIC BLOOD PRESSURE: 70 MMHG | WEIGHT: 160 LBS | SYSTOLIC BLOOD PRESSURE: 124 MMHG | HEIGHT: 62 IN | HEART RATE: 80 BPM | BODY MASS INDEX: 29.44 KG/M2

## 2023-07-25 DIAGNOSIS — M25.561 ACUTE PAIN OF RIGHT KNEE: ICD-10-CM

## 2023-07-25 DIAGNOSIS — E78.2 MIXED HYPERLIPIDEMIA: ICD-10-CM

## 2023-07-25 DIAGNOSIS — I10 ESSENTIAL HYPERTENSION: Primary | ICD-10-CM

## 2023-07-25 LAB — 25(OH)D3 SERPL-MCNC: 38 NG/ML (ref 30–100)

## 2023-07-25 PROCEDURE — 99214 OFFICE O/P EST MOD 30 MIN: CPT | Performed by: INTERNAL MEDICINE

## 2023-07-25 NOTE — ASSESSMENT & PLAN NOTE
Essential hypertension with current blood pressure 124/78 heart rate of 80 on lisinopril 5 mg daily continue therapy with recent CMP July 24 normal kidney and electrolytes continue therapy

## 2023-07-25 NOTE — PATIENT INSTRUCTIONS
Recent lab discussed  Continue healthy cardiac diet with no snacks and weight loss  Continue ADL activity  Continue current medications  Return for annual preventative in 6 months or as needed

## 2023-07-25 NOTE — ASSESSMENT & PLAN NOTE
Patient does have grinding in both knees right greater than left she is on a diet and has lost weight with BMI down to 29.26 she does take Tylenol she is careful about NSAID therapy continue weight loss and Tylenol as needed

## 2023-07-25 NOTE — PROGRESS NOTES
"[facility]     Hiwot Ren  1960   [id number]      Patient Care Team:  Kimani Adams MD as PCP - General (Internal Medicine)    Chief Complaint::   Chief Complaint   Patient presents with    Hypertension     6 mo follow up    Hyperlipidemia            HPI  The patient is a 62-year-old female with hypertension, mixed hyperlipidemia, and overweight, having lost 4 pounds in the last 7 months.    The patient reports that she is feeling well overall and is pleased with her weight loss. She states that her \"bad\" cholesterol was slightly elevated. Her vitamin D level is good, and she takes vitamin D three times a week on Monday, Wednesday, and Friday. The patient is employed at [employer] and stays busy.     Her cholesterol is controlled.     She denies any headache or dizziness. She denies any significant sinus allergy symptoms. She states that she takes an antihistamine in the morning and at night, which seems to be beneficial. She has used Zyrtec-D once or twice, as needed. She denies any trouble swallowing. She denies any coughing, wheezing, or shortness of breath. She denies any chest pain or heart skipping. She denies any stomach trouble. She states that her bladder is in good control. The patient notes that her hips, knees, and ankles are doing okay. She states that her right knee is bothersome, but it is good currently. She notes that she has been walking and biking, which seems to have improved her strength. The patient states that she is sleeping well at night.    The patient has decreased her junk food intake and has been exercising and bicycling. She states that she is motivated now to lose weight. Her goal weight is 155 pounds. She states that she gained weight in 2022.       Patient Active Problem List   Diagnosis    Colitis    Essential hypertension    Hyperlipidemia    Dehydration    Leukocytosis    Lower GI bleed    Chronic allergic rhinitis    Vitamin D deficiency    Otitis    " "History of Hodgkin's disease    Hodgkin's disease    Ischemic colitis    Melasma    Elevated platelet count    Acute pain of right knee    Preventative health care    Preventative health care        Past Medical History:   Diagnosis Date    Colitis 07/02/2015    MED RX 07/04/2018    Drug therapy     Hodgkin's lymphoma 1991    chemo-25 yrs ago     Hyperlipidemia     Hypertension        Past Surgical History:   Procedure Laterality Date    BREAST BIOPSY      BREAST CYST EXCISION         Family History   Problem Relation Age of Onset    Breast cancer Maternal Aunt 18    Lung cancer Mother     Heart disease Father     Parkinsonism Father     Diabetes Father     Alzheimer's disease Father        Social History     Socioeconomic History    Marital status:    Tobacco Use    Smoking status: Never    Smokeless tobacco: Never   Substance and Sexual Activity    Alcohol use: No    Drug use: No       No Known Allergies    Review of Systems     HEENT: Denies any hearing changes, eyesight changes, no headache or dizziness  NECK: Denies any pain, stiffness, swelling or dysphagia  CHEST: Denies cough or wheeze or recent lung infections  CARDIAC: Denies chest pain, pressure or palpitations  ABD: Denies nausea, vomiting or abdominal pain  : Denies dysuria  NEURO: Denies syncope, concussion, neuropathy  PSYCH: Denies any stress  EXTREM: Denies arthritic changes myalgia or arthralgia  SKIN: Denies any rash    Vital Signs  Vitals:    07/25/23 1455 07/25/23 1519   BP: 152/98 124/70   BP Location: Right arm    Patient Position: Sitting    Cuff Size: Adult    Pulse: 80    Weight: 72.6 kg (160 lb)    Height: 157.5 cm (62\")    PainSc: 0-No pain      Body mass index is 29.26 kg/m².        Advance Care Planning         Current Outpatient Medications:     Cholecalciferol (Vitamin D3) 1.25 MG (23311 UT) capsule, TAKE 1 TABLET BY MOUTH EVERY 7 (SEVEN) DAYS FOR 5 WEEKS, Disp: 5 capsule, Rfl: 0    ELDERBERRY PO, Take 1 tablet by mouth " Daily., Disp: , Rfl:     lisinopril (PRINIVIL,ZESTRIL) 5 MG tablet, TAKE 1 TABLET BY MOUTH EVERY DAY, Disp: 90 tablet, Rfl: 3    Multiple Vitamins-Minerals (MULTIVITAMIN ADULT PO), Take 1 tablet by mouth Daily., Disp: , Rfl:     simvastatin (ZOCOR) 40 MG tablet, TAKE 1 TABLET BY MOUTH EVERY DAY IN THE EVENING, Disp: 90 tablet, Rfl: 3    Physical Exam     ACE III MINI         HEENT: Pupils equal reactive ENT clear, no facial asymmetry, pharynx is clear  NECK: No masses bruit or thyromegaly  CHEST: Clear without rales wheezes or rhonchi  CARDIAC: Regular rhythm without gallop rub or click, blood pressure heart rate stable. Her blood pressure today is 118/74 mmHg.   ABD: Liver spleen normal, good bowel sounds, nontender  : Deferred  NEURO: Intact  PSYCH: Normal  EXTREM: No significant arthritic changes, no edema. Grinding in knees bilaterally.   SKIN: Clear     Results Review:    Recent Results (from the past 672 hour(s))   Comprehensive Metabolic Panel    Collection Time: 07/24/23 11:41 AM    Specimen: Blood   Result Value Ref Range    Glucose 94 65 - 99 mg/dL    BUN 18 8 - 23 mg/dL    Creatinine 0.85 0.57 - 1.00 mg/dL    Sodium 138 136 - 145 mmol/L    Potassium 4.7 3.5 - 5.2 mmol/L    Chloride 101 98 - 107 mmol/L    CO2 26.1 22.0 - 29.0 mmol/L    Calcium 10.4 8.6 - 10.5 mg/dL    Total Protein 7.3 6.0 - 8.5 g/dL    Albumin 4.6 3.5 - 5.2 g/dL    ALT (SGPT) 23 1 - 33 U/L    AST (SGOT) 31 1 - 32 U/L    Alkaline Phosphatase 46 39 - 117 U/L    Total Bilirubin 0.9 0.0 - 1.2 mg/dL    Globulin 2.7 gm/dL    A/G Ratio 1.7 g/dL    BUN/Creatinine Ratio 21.2 7.0 - 25.0    Anion Gap 10.9 5.0 - 15.0 mmol/L    eGFR 77.6 >60.0 mL/min/1.73   Lipid Panel    Collection Time: 07/24/23 11:41 AM    Specimen: Blood   Result Value Ref Range    Total Cholesterol 181 0 - 200 mg/dL    Triglycerides 111 0 - 150 mg/dL    HDL Cholesterol 54 40 - 60 mg/dL    LDL Cholesterol  107 (H) 0 - 100 mg/dL    VLDL Cholesterol 20 5 - 40 mg/dL    LDL/HDL  Ratio 1.94    TSH    Collection Time: 07/24/23 11:41 AM    Specimen: Blood   Result Value Ref Range    TSH 1.640 0.270 - 4.200 uIU/mL   Vitamin D,25-Hydroxy    Collection Time: 07/24/23 11:41 AM    Specimen: Blood   Result Value Ref Range    25 Hydroxy, Vitamin D 38.0 30.0 - 100.0 ng/ml   CBC Auto Differential    Collection Time: 07/24/23 11:41 AM    Specimen: Blood   Result Value Ref Range    WBC 3.91 3.40 - 10.80 10*3/mm3    RBC 4.94 3.77 - 5.28 10*6/mm3    Hemoglobin 14.9 12.0 - 15.9 g/dL    Hematocrit 44.1 34.0 - 46.6 %    MCV 89.3 79.0 - 97.0 fL    MCH 30.2 26.6 - 33.0 pg    MCHC 33.8 31.5 - 35.7 g/dL    RDW 12.6 12.3 - 15.4 %    RDW-SD 40.9 37.0 - 54.0 fl    MPV 9.1 6.0 - 12.0 fL    Platelets 316 140 - 450 10*3/mm3    Neutrophil % 58.0 42.7 - 76.0 %    Lymphocyte % 27.9 19.6 - 45.3 %    Monocyte % 10.5 5.0 - 12.0 %    Eosinophil % 2.8 0.3 - 6.2 %    Basophil % 0.8 0.0 - 1.5 %    Immature Grans % 0.0 0.0 - 0.5 %    Neutrophils, Absolute 2.27 1.70 - 7.00 10*3/mm3    Lymphocytes, Absolute 1.09 0.70 - 3.10 10*3/mm3    Monocytes, Absolute 0.41 0.10 - 0.90 10*3/mm3    Eosinophils, Absolute 0.11 0.00 - 0.40 10*3/mm3    Basophils, Absolute 0.03 0.00 - 0.20 10*3/mm3    Immature Grans, Absolute 0.00 0.00 - 0.05 10*3/mm3    nRBC 0.0 0.0 - 0.2 /100 WBC     Procedures lab performed July 24    Medication Review: Medications reviewed and noted    Patient wellness counseling  Exercise: Encouraged walking exercise and weight loss  Diet: Encouraged healthy cardiac diet and weight loss  Screening: Recent lab of vitamin D TSH lipid panel CMP CBC discussed and normal  Social History     Socioeconomic History    Marital status:    Tobacco Use    Smoking status: Never    Smokeless tobacco: Never   Substance and Sexual Activity    Alcohol use: No    Drug use: No        Assessment/Plan:    Problem List Items Addressed This Visit          Cardiac and Vasculature    Essential hypertension - Primary    Overview     Essential  hypertension treated with lisinopril 5 mg daily CMP normal         Current Assessment & Plan       Essential hypertension with current blood pressure 124/78 heart rate of 80 on lisinopril 5 mg daily continue therapy with recent CMP July 24 normal kidney and electrolytes continue therapy         Relevant Medications    lisinopril (PRINIVIL,ZESTRIL) 5 MG tablet    Hyperlipidemia    Overview     Mixed  Hyperlipidemia treated with simvastatin 40 mg daily denies myalgia arthralgia         Current Assessment & Plan       Mixed hyperlipidemia on simvastatin 40 mg daily with recent cholesterol of July 24 is 181 HDL 54 and LDL of 107 with normal liver functions continue current therapy         Relevant Medications    simvastatin (ZOCOR) 40 MG tablet       Musculoskeletal and Injuries    Acute pain of right knee    Overview     Voltaren gel apply twice daily  Xray today  Referral to [facility]    Discussed possible referral to orthopedics if xray shows significant degenerative changes.          Current Assessment & Plan     Patient does have grinding in both knees right greater than left she is on a diet and has lost weight with BMI down to 29.26 she does take Tylenol she is careful about NSAID therapy continue weight loss and Tylenol as needed               Patient Instructions   Recent lab discussed  Continue healthy cardiac diet with no snacks and weight loss  Continue ADL activity  Continue current medications  Return for annual preventative in 6 months or as needed     Plan of care reviewed with patient at the conclusion of today's visit. Education was provided regarding diagnosis, management, and any prescribed or recommended OTC medications.Patient verbalizes understanding of and agreement with management plan.         Kimani Adams MD      Note: Part of this note may be an electronic transcription/translation of spoken language to printed text using the Dragon Dictation system.    Transcribed from  ambient dictation for Kimani Adams MD by Noris Dey.  07/25/23   17:20 EDT    Patient or patient representative verbalized consent to the visit recording.  I have personally performed the services described in this document as transcribed by the above individual, and it is both accurate and complete.

## 2023-07-25 NOTE — ASSESSMENT & PLAN NOTE
Mixed hyperlipidemia on simvastatin 40 mg daily with recent cholesterol of July 24 is 181 HDL 54 and LDL of 107 with normal liver functions continue current therapy

## 2023-10-02 RX ORDER — AMOXICILLIN AND CLAVULANATE POTASSIUM 500; 125 MG/1; MG/1
1 TABLET, FILM COATED ORAL 2 TIMES DAILY
Qty: 16 TABLET | Refills: 1 | Status: SHIPPED | OUTPATIENT
Start: 2023-10-02

## 2023-10-04 RX ORDER — LISINOPRIL 5 MG/1
TABLET ORAL
Qty: 90 TABLET | Refills: 3 | Status: SHIPPED | OUTPATIENT
Start: 2023-10-04

## 2024-03-20 RX ORDER — AMOXICILLIN AND CLAVULANATE POTASSIUM 500; 125 MG/1; MG/1
1 TABLET, FILM COATED ORAL 2 TIMES DAILY
Qty: 16 TABLET | Refills: 1 | Status: SHIPPED | OUTPATIENT
Start: 2024-03-20

## 2024-04-08 RX ORDER — SIMVASTATIN 40 MG
TABLET ORAL
Qty: 90 TABLET | Refills: 3 | Status: SHIPPED | OUTPATIENT
Start: 2024-04-08

## 2024-06-13 DIAGNOSIS — I10 ESSENTIAL HYPERTENSION: Primary | ICD-10-CM

## 2024-06-13 DIAGNOSIS — E55.9 VITAMIN D DEFICIENCY: ICD-10-CM

## 2024-06-13 DIAGNOSIS — E78.2 MIXED HYPERLIPIDEMIA: ICD-10-CM

## 2024-06-16 PROBLEM — Z00.00 PREVENTATIVE HEALTH CARE: Status: ACTIVE | Noted: 2024-06-16

## 2024-06-19 ENCOUNTER — LAB (OUTPATIENT)
Dept: LAB | Facility: HOSPITAL | Age: 64
End: 2024-06-19
Payer: COMMERCIAL

## 2024-06-19 DIAGNOSIS — E78.2 MIXED HYPERLIPIDEMIA: ICD-10-CM

## 2024-06-19 DIAGNOSIS — E55.9 VITAMIN D DEFICIENCY: ICD-10-CM

## 2024-06-19 DIAGNOSIS — I10 ESSENTIAL HYPERTENSION: ICD-10-CM

## 2024-06-19 PROCEDURE — 80050 GENERAL HEALTH PANEL: CPT

## 2024-06-19 PROCEDURE — 82306 VITAMIN D 25 HYDROXY: CPT

## 2024-06-19 PROCEDURE — 80061 LIPID PANEL: CPT

## 2024-06-20 ENCOUNTER — OFFICE VISIT (OUTPATIENT)
Dept: INTERNAL MEDICINE | Facility: CLINIC | Age: 64
End: 2024-06-20

## 2024-06-20 VITALS
OXYGEN SATURATION: 98 % | HEIGHT: 62 IN | HEART RATE: 82 BPM | SYSTOLIC BLOOD PRESSURE: 126 MMHG | BODY MASS INDEX: 28.89 KG/M2 | WEIGHT: 157 LBS | DIASTOLIC BLOOD PRESSURE: 84 MMHG

## 2024-06-20 DIAGNOSIS — Z00.00 PREVENTATIVE HEALTH CARE: Primary | ICD-10-CM

## 2024-06-20 DIAGNOSIS — C81.98 HODGKIN LYMPHOMA OF LYMPH NODES OF MULTIPLE REGIONS, UNSPECIFIED HODGKIN LYMPHOMA TYPE: ICD-10-CM

## 2024-06-20 DIAGNOSIS — E78.2 MIXED HYPERLIPIDEMIA: ICD-10-CM

## 2024-06-20 DIAGNOSIS — I10 ESSENTIAL HYPERTENSION: ICD-10-CM

## 2024-06-20 DIAGNOSIS — I51.7 MILD LEFT ATRIAL ENLARGEMENT: ICD-10-CM

## 2024-06-20 LAB
25(OH)D3 SERPL-MCNC: 41.6 NG/ML (ref 30–100)
ALBUMIN SERPL-MCNC: 4.6 G/DL (ref 3.5–5.2)
ALBUMIN/GLOB SERPL: 2.4 G/DL
ALP SERPL-CCNC: 44 U/L (ref 39–117)
ALT SERPL W P-5'-P-CCNC: 27 U/L (ref 1–33)
ANION GAP SERPL CALCULATED.3IONS-SCNC: 6.5 MMOL/L (ref 5–15)
AST SERPL-CCNC: 28 U/L (ref 1–32)
BASOPHILS # BLD AUTO: 0.04 10*3/MM3 (ref 0–0.2)
BASOPHILS NFR BLD AUTO: 1 % (ref 0–1.5)
BILIRUB SERPL-MCNC: 0.7 MG/DL (ref 0–1.2)
BUN SERPL-MCNC: 14 MG/DL (ref 8–23)
BUN/CREAT SERPL: 17.1 (ref 7–25)
CALCIUM SPEC-SCNC: 9.7 MG/DL (ref 8.6–10.5)
CHLORIDE SERPL-SCNC: 106 MMOL/L (ref 98–107)
CHOLEST SERPL-MCNC: 155 MG/DL (ref 0–200)
CO2 SERPL-SCNC: 26.5 MMOL/L (ref 22–29)
CREAT SERPL-MCNC: 0.82 MG/DL (ref 0.57–1)
DEPRECATED RDW RBC AUTO: 39.6 FL (ref 37–54)
EGFRCR SERPLBLD CKD-EPI 2021: 80.5 ML/MIN/1.73
EOSINOPHIL # BLD AUTO: 0.11 10*3/MM3 (ref 0–0.4)
EOSINOPHIL NFR BLD AUTO: 2.7 % (ref 0.3–6.2)
ERYTHROCYTE [DISTWIDTH] IN BLOOD BY AUTOMATED COUNT: 12.5 % (ref 12.3–15.4)
GLOBULIN UR ELPH-MCNC: 1.9 GM/DL
GLUCOSE SERPL-MCNC: 87 MG/DL (ref 65–99)
HCT VFR BLD AUTO: 43.1 % (ref 34–46.6)
HDLC SERPL-MCNC: 59 MG/DL (ref 40–60)
HGB BLD-MCNC: 14.4 G/DL (ref 12–15.9)
IMM GRANULOCYTES # BLD AUTO: 0.01 10*3/MM3 (ref 0–0.05)
IMM GRANULOCYTES NFR BLD AUTO: 0.2 % (ref 0–0.5)
LDLC SERPL CALC-MCNC: 81 MG/DL (ref 0–100)
LDLC/HDLC SERPL: 1.37 {RATIO}
LYMPHOCYTES # BLD AUTO: 1.19 10*3/MM3 (ref 0.7–3.1)
LYMPHOCYTES NFR BLD AUTO: 29.1 % (ref 19.6–45.3)
MCH RBC QN AUTO: 29.4 PG (ref 26.6–33)
MCHC RBC AUTO-ENTMCNC: 33.4 G/DL (ref 31.5–35.7)
MCV RBC AUTO: 88 FL (ref 79–97)
MONOCYTES # BLD AUTO: 0.4 10*3/MM3 (ref 0.1–0.9)
MONOCYTES NFR BLD AUTO: 9.8 % (ref 5–12)
NEUTROPHILS NFR BLD AUTO: 2.34 10*3/MM3 (ref 1.7–7)
NEUTROPHILS NFR BLD AUTO: 57.2 % (ref 42.7–76)
NRBC BLD AUTO-RTO: 0 /100 WBC (ref 0–0.2)
PLATELET # BLD AUTO: 348 10*3/MM3 (ref 140–450)
PMV BLD AUTO: 9 FL (ref 6–12)
POTASSIUM SERPL-SCNC: 4.2 MMOL/L (ref 3.5–5.2)
PROT SERPL-MCNC: 6.5 G/DL (ref 6–8.5)
RBC # BLD AUTO: 4.9 10*6/MM3 (ref 3.77–5.28)
SODIUM SERPL-SCNC: 139 MMOL/L (ref 136–145)
TRIGL SERPL-MCNC: 76 MG/DL (ref 0–150)
TSH SERPL DL<=0.05 MIU/L-ACNC: 1.5 UIU/ML (ref 0.27–4.2)
VLDLC SERPL-MCNC: 15 MG/DL (ref 5–40)
WBC NRBC COR # BLD AUTO: 4.09 10*3/MM3 (ref 3.4–10.8)

## 2024-06-20 NOTE — PROGRESS NOTES
[facility]     Hiwot Ren  1960   [id number]      Patient Care Team:  Kimani Adams MD as PCP - General (Internal Medicine)    Chief Complaint::   Chief Complaint   Patient presents with    Annual Exam     Labs completed and resulted            HPI  History of Present Illness  Patient 63-year-old female presents for annual preventative visit and physical examination overall feeling well complaining of allergy primarily in the a.m. takes Claritin generic as needed or Zyrtec-D if she has a lot of congestion denies significant headache dizziness hearing or vision changes denies pain or stiffness in her neck denies cough or wheeze is a non-smoker last mammogram was April 2023 denies chest pain pressure denies palpitations history of hypertension on therapy denies nausea vomiting abdominal pain last colonoscopy was June 13, 2018 would like to repeat that in 10 years or 2028 days of some right knee soreness does wear an elastic band when she is working or exercising.           Patient Active Problem List   Diagnosis    Colitis    Essential hypertension    Hyperlipidemia    Dehydration    Leukocytosis    Lower GI bleed    Chronic allergic rhinitis    Vitamin D deficiency    Otitis    History of Hodgkin's disease    Hodgkin's disease    Ischemic colitis    Melasma    Elevated platelet count    Acute pain of right knee    Preventative health care    Preventative health care    Preventative health care    Mild left atrial enlargement        Past Medical History:   Diagnosis Date    Colitis 07/02/2015    MED RX 07/04/2018    Drug therapy     Hodgkin's lymphoma 1991    chemo-25 yrs ago     Hyperlipidemia     Hypertension        Past Surgical History:   Procedure Laterality Date    BREAST BIOPSY      BREAST CYST EXCISION         Family History   Problem Relation Age of Onset    Breast cancer Maternal Aunt 18    Lung cancer Mother     Heart disease Father     Parkinsonism Father     Diabetes Father      Alzheimer's disease Father        Social History     Socioeconomic History    Marital status:    Tobacco Use    Smoking status: Never    Smokeless tobacco: Never   Vaping Use    Vaping status: Never Used   Substance and Sexual Activity    Alcohol use: No    Drug use: No    Sexual activity: Defer       No Known Allergies    Immunization History   Administered Date(s) Administered    COVID-19 (PFIZER) BIVALENT 12+YRS 01/04/2023    COVID-19 (PFIZER) Purple Cap Monovalent 03/13/2021, 04/02/2021, 12/29/2021    Fluzone (or Fluarix & Flulaval for VFC) >6mos 10/22/2020    Tdap 01/27/2016        Health Maintenance Due   Topic Date Due    Pneumococcal Vaccine 0-64 (1 of 2 - PCV) Never done    ZOSTER VACCINE (1 of 2) Never done    HEPATITIS C SCREENING  Never done    PAP SMEAR  Never done    RSV Vaccine - Adults (1 - 1-dose 60+ series) Never done    COLORECTAL CANCER SCREENING  06/13/2023    COVID-19 Vaccine (5 - 2023-24 season) 09/01/2023        Review of Systems   Physical Exam         HEENT: Denies headache or dizzy has occasional allergy congestion denies significant hearing or vision changes  NECK: Denies pain stiffness swelling dysphagia or reflux  CHEST: Denies cough wheeze or shortness of breath denies recent pulmonary infections  CARDIAC: Denies chest pain pressure denies palpitations has mild hypertension well-controlled and has an EKG that shows sinus rhythm with left atrial enlargement asymptomatic  ABD: Denies nausea vomiting abdominal pain last colonoscopy was 2018 and desires a repeat in 10 years not 5  : Denies dysuria frequency  NEURO: Denies syncope concussion  PSYCH: Denies anxiety depression   EXTREM: Mild right knee discomfort and osteoarthritis and grinding wears an elastic band when exercising i.e. walking    Vital Signs  Vitals:    06/20/24 0942   BP: 126/84   BP Location: Left arm   Patient Position: Sitting   Cuff Size: Adult   Pulse: 82   SpO2: 98%   Weight: 71.2 kg (157 lb)   Height: 157.5  "cm (62\")   PainSc: 0-No pain     Body mass index is 28.72 kg/m².  BMI is >= 25 and <30. (Overweight) The following options were offered after discussion;: exercise counseling/recommendations and nutrition counseling/recommendations     Advance Care Planning         Current Outpatient Medications:     ELDERBERRY PO, Take 1 tablet by mouth Daily., Disp: , Rfl:     lisinopril (PRINIVIL,ZESTRIL) 5 MG tablet, TAKE 1 TABLET BY MOUTH EVERY DAY, Disp: 90 tablet, Rfl: 3    Multiple Vitamins-Minerals (MULTIVITAMIN ADULT PO), Take 1 tablet by mouth Daily., Disp: , Rfl:     simvastatin (ZOCOR) 40 MG tablet, TAKE 1 TABLET BY MOUTH EVERY DAY IN THE EVENING, Disp: 90 tablet, Rfl: 3    vitamin D3 125 MCG (5000 UT) capsule capsule, Take 1 capsule by mouth 3 (Three) Times a Week., Disp: , Rfl:     Physical Exam   HEENT: Pupils equal reactive ENT clear no facial asymmetry sinuses are normal pharynx is clear  NECK: No mass bruit thyromegaly neck vein distention  CHEST: Clear without rales wheezes or rhonchi  CARDIAC: Regular rhythm without gallop or rub blood pressure heart rate stable with repeated blood pressure 126/76  ABD: Liver and spleen are normal positive bowel sounds no bruit  : Deferred  NEURO: Intact  PSYCH: Normal  EXTREM: Mild knee grinding no fluid good range of motion not unstable  Skin: No rash     Results Review:    Results         ECG 12 Lead    Date/Time: 6/20/2024 5:12 PM  Performed by: Kimani Adams MD    Authorized by: Kimani Adams MD  Comparison: compared with previous ECG from 1/4/2023  Similar to previous ECG  Comparison to previous ECG: Current EKG shows sinus rhythm left axis similar to EKG of January 4, 2023  Rhythm: sinus rhythm  Rate: normal  Conduction: conduction normal  ST Segments: ST segments normal  T Waves: T waves normal  QRS axis: normal  Other findings: left atrial abnormality    Clinical impression: abnormal EKG  Comments: Current EKG shows sinus rhythm with left atrial enlargement similar " to EKG of January 4, 2023 asymptomatic and no change          Medication Review: Medications reviewed and noted    Patient wellness counseling  Exercise: Continue walking exercise  Diet: Continue healthy cardiac diet  Screening: Recent lab of June 19 discussed and EKG discussed  Social History     Socioeconomic History    Marital status:    Tobacco Use    Smoking status: Never    Smokeless tobacco: Never   Vaping Use    Vaping status: Never Used   Substance and Sexual Activity    Alcohol use: No    Drug use: No    Sexual activity: Defer        Assessment/Plan:  Assessment & Plan       Problem List Items Addressed This Visit          Cardiac and Vasculature    Essential hypertension    Overview     Essential hypertension treated with lisinopril 5 mg daily CMP normal         Current Assessment & Plan     Essential hypertension with blood pressure 126/84 heart rate of 82 O2 saturation 98% on lisinopril 5 mg daily denies headache or hyperreactive airway tickle cough-continue therapy EKG shows sinus rhythm with left atrial enlargement unchanged from January 4, 2023 patient's lab of CMP normal blood sugar kidney function liver function and electrolyte salts continue therapy         Relevant Medications    lisinopril (PRINIVIL,ZESTRIL) 5 MG tablet    Other Relevant Orders    ECG 12 Lead    Hyperlipidemia    Overview     Mixed  Hyperlipidemia treated with simvastatin 40 mg daily denies myalgia arthralgia         Current Assessment & Plan       Mixed hyperlipidemia on simvastatin 40 mg daily denies myalgia or arthralgia recent lab of June 19 normal liver functions with cholesterol of 155 and LDL of 81 and HDL 59 continue therapy and continue healthy cardiac diet with reduce cholesterol and weight loss         Relevant Medications    simvastatin (ZOCOR) 40 MG tablet    Mild left atrial enlargement    Overview     Current EKG consistent with left atrial enlargement mild and similar to EKG of January 4, 2023 and  "asymptomatic         Relevant Orders    ECG 12 Lead       Health Encounters    Preventative health care - Primary    Overview     Patient is 63-year-old female presents for annual preventative visit and physical exam on June 20, 2024            Hematology and Neoplasia    Hodgkin's disease    Overview     Unspecified type  Treated with chemotherapy 1990         Current Assessment & Plan     Hodgkin's disease lymphoma with chemotherapy 1990 no evidence of recurrence             Patient Instructions   Lab of June 19, 2024 discussed  EKG discussed with mild left atrial enlargement no change  Continue all current therapy  Continue wearing elastic brace on right knee  Continue ADL  Continue healthy cardiac diet and weight loss having lost 3 pounds in the past 1 year with current weight 157  Return visit in 6 months or as needed     CMP:  Lab Results   Component Value Date    BUN 14 06/19/2024    CREATININE 0.82 06/19/2024    EGFRIFNONA 71 10/18/2021    BCR 17.1 06/19/2024     06/19/2024    K 4.2 06/19/2024    CO2 26.5 06/19/2024    CALCIUM 9.7 06/19/2024    ALBUMIN 4.6 06/19/2024    BILITOT 0.7 06/19/2024    ALKPHOS 44 06/19/2024    AST 28 06/19/2024    ALT 27 06/19/2024     HbA1c:  No results found for: \"HGBA1C\"  Microalbumin:  No results found for: \"MICROALBUR\", \"POCMALB\", \"POCCREAT\"  Lipid Panel  Lab Results   Component Value Date    CHOL 155 06/19/2024    TRIG 76 06/19/2024    HDL 59 06/19/2024    LDL 81 06/19/2024    AST 28 06/19/2024    ALT 27 06/19/2024        Counseling was given to patient for the following topics: disease prevention.    Plan of care reviewed with patient at the conclusion of today's visit. Education was provided regarding diagnosis, management, and any prescribed or recommended OTC medications.Patient verbalizes understanding of and agreement with management plan.         Transcribed from ambient dictation for Kimani Adams MD by Maggie Gonzalez RN.  06/20/24   09:57 EDT    Patient or " patient representative verbalized consent for the use of Ambient Listening during the visit with  Kimani Adams MD for chart documentation. 6/20/2024  17:14 EDT

## 2024-06-20 NOTE — ASSESSMENT & PLAN NOTE
Essential hypertension with blood pressure 126/84 heart rate of 82 O2 saturation 98% on lisinopril 5 mg daily denies headache or hyperreactive airway tickle cough-continue therapy EKG shows sinus rhythm with left atrial enlargement unchanged from January 4, 2023 patient's lab of CMP normal blood sugar kidney function liver function and electrolyte salts continue therapy

## 2024-06-20 NOTE — ASSESSMENT & PLAN NOTE
Mixed hyperlipidemia on simvastatin 40 mg daily denies myalgia or arthralgia recent lab of June 19 normal liver functions with cholesterol of 155 and LDL of 81 and HDL 59 continue therapy and continue healthy cardiac diet with reduce cholesterol and weight loss

## 2024-06-20 NOTE — PATIENT INSTRUCTIONS
Lab of June 19, 2024 discussed  EKG discussed with mild left atrial enlargement no change  Continue all current therapy  Continue wearing elastic brace on right knee  Continue ADL  Continue healthy cardiac diet and weight loss having lost 3 pounds in the past 1 year with current weight 157  Return visit in 6 months or as needed

## 2024-08-14 RX ORDER — LISINOPRIL 5 MG/1
TABLET ORAL
Qty: 90 TABLET | Refills: 3 | Status: SHIPPED | OUTPATIENT
Start: 2024-08-14

## 2024-12-16 RX ORDER — AMOXICILLIN AND CLAVULANATE POTASSIUM 500; 125 MG/1; MG/1
1 TABLET, FILM COATED ORAL 2 TIMES DAILY
Qty: 16 TABLET | Refills: 1 | Status: SHIPPED | OUTPATIENT
Start: 2024-12-16

## 2025-04-01 RX ORDER — SIMVASTATIN 40 MG
40 TABLET ORAL EVERY EVENING
Qty: 90 TABLET | Refills: 3 | Status: SHIPPED | OUTPATIENT
Start: 2025-04-01

## 2025-05-27 RX ORDER — SULFAMETHOXAZOLE AND TRIMETHOPRIM 800; 160 MG/1; MG/1
1 TABLET ORAL 2 TIMES DAILY
Qty: 16 TABLET | Refills: 1 | Status: SHIPPED | OUTPATIENT
Start: 2025-05-27 | End: 2025-05-27

## 2025-05-27 RX ORDER — NITROFURANTOIN 25; 75 MG/1; MG/1
100 CAPSULE ORAL 2 TIMES DAILY
Qty: 16 CAPSULE | Refills: 0 | Status: SHIPPED | OUTPATIENT
Start: 2025-05-27

## 2025-07-07 DIAGNOSIS — I10 ESSENTIAL HYPERTENSION: Primary | ICD-10-CM

## 2025-07-07 DIAGNOSIS — E78.2 MIXED HYPERLIPIDEMIA: ICD-10-CM

## 2025-07-07 DIAGNOSIS — N39.0 URINARY TRACT INFECTION WITHOUT HEMATURIA, SITE UNSPECIFIED: ICD-10-CM

## 2025-07-16 ENCOUNTER — LAB (OUTPATIENT)
Dept: LAB | Facility: HOSPITAL | Age: 65
End: 2025-07-16
Payer: COMMERCIAL

## 2025-07-16 DIAGNOSIS — I10 ESSENTIAL HYPERTENSION: ICD-10-CM

## 2025-07-16 DIAGNOSIS — E78.2 MIXED HYPERLIPIDEMIA: ICD-10-CM

## 2025-07-16 DIAGNOSIS — N39.0 URINARY TRACT INFECTION WITHOUT HEMATURIA, SITE UNSPECIFIED: ICD-10-CM

## 2025-07-16 LAB
BASOPHILS # BLD AUTO: 0.03 10*3/MM3 (ref 0–0.2)
BASOPHILS NFR BLD AUTO: 0.6 % (ref 0–1.5)
DEPRECATED RDW RBC AUTO: 40.8 FL (ref 37–54)
EOSINOPHIL # BLD AUTO: 0.14 10*3/MM3 (ref 0–0.4)
EOSINOPHIL NFR BLD AUTO: 2.8 % (ref 0.3–6.2)
ERYTHROCYTE [DISTWIDTH] IN BLOOD BY AUTOMATED COUNT: 12.6 % (ref 12.3–15.4)
HCT VFR BLD AUTO: 42.7 % (ref 34–46.6)
HGB BLD-MCNC: 14 G/DL (ref 12–15.9)
HOLD SPECIMEN: NORMAL
IMM GRANULOCYTES # BLD AUTO: 0.02 10*3/MM3 (ref 0–0.05)
IMM GRANULOCYTES NFR BLD AUTO: 0.4 % (ref 0–0.5)
LYMPHOCYTES # BLD AUTO: 1.27 10*3/MM3 (ref 0.7–3.1)
LYMPHOCYTES NFR BLD AUTO: 25.6 % (ref 19.6–45.3)
MCH RBC QN AUTO: 29.6 PG (ref 26.6–33)
MCHC RBC AUTO-ENTMCNC: 32.8 G/DL (ref 31.5–35.7)
MCV RBC AUTO: 90.3 FL (ref 79–97)
MONOCYTES # BLD AUTO: 0.57 10*3/MM3 (ref 0.1–0.9)
MONOCYTES NFR BLD AUTO: 11.5 % (ref 5–12)
NEUTROPHILS NFR BLD AUTO: 2.93 10*3/MM3 (ref 1.7–7)
NEUTROPHILS NFR BLD AUTO: 59.1 % (ref 42.7–76)
NRBC BLD AUTO-RTO: 0 /100 WBC (ref 0–0.2)
PLATELET # BLD AUTO: 351 10*3/MM3 (ref 140–450)
PMV BLD AUTO: 9.2 FL (ref 6–12)
RBC # BLD AUTO: 4.73 10*6/MM3 (ref 3.77–5.28)
WBC NRBC COR # BLD AUTO: 4.96 10*3/MM3 (ref 3.4–10.8)

## 2025-07-16 PROCEDURE — 80061 LIPID PANEL: CPT

## 2025-07-16 PROCEDURE — 80050 GENERAL HEALTH PANEL: CPT

## 2025-07-16 PROCEDURE — 81001 URINALYSIS AUTO W/SCOPE: CPT

## 2025-07-17 ENCOUNTER — RESULTS FOLLOW-UP (OUTPATIENT)
Dept: LAB | Facility: HOSPITAL | Age: 65
End: 2025-07-17
Payer: COMMERCIAL

## 2025-07-17 LAB
ALBUMIN SERPL-MCNC: 4.6 G/DL (ref 3.5–5.2)
ALBUMIN/GLOB SERPL: 1.6 G/DL
ALP SERPL-CCNC: 47 U/L (ref 39–117)
ALT SERPL W P-5'-P-CCNC: 21 U/L (ref 1–33)
ANION GAP SERPL CALCULATED.3IONS-SCNC: 10.9 MMOL/L (ref 5–15)
AST SERPL-CCNC: 28 U/L (ref 1–32)
BACTERIA UR QL AUTO: NORMAL /HPF
BILIRUB SERPL-MCNC: 0.9 MG/DL (ref 0–1.2)
BILIRUB UR QL STRIP: NEGATIVE
BUN SERPL-MCNC: 15 MG/DL (ref 8–23)
BUN/CREAT SERPL: 16.9 (ref 7–25)
CALCIUM SPEC-SCNC: 9.9 MG/DL (ref 8.6–10.5)
CHLORIDE SERPL-SCNC: 102 MMOL/L (ref 98–107)
CHOLEST SERPL-MCNC: 174 MG/DL (ref 0–200)
CLARITY UR: CLEAR
CO2 SERPL-SCNC: 27.1 MMOL/L (ref 22–29)
COLOR UR: YELLOW
CREAT SERPL-MCNC: 0.89 MG/DL (ref 0.57–1)
EGFRCR SERPLBLD CKD-EPI 2021: 72.5 ML/MIN/1.73
GLOBULIN UR ELPH-MCNC: 2.8 GM/DL
GLUCOSE SERPL-MCNC: 90 MG/DL (ref 65–99)
GLUCOSE UR STRIP-MCNC: NEGATIVE MG/DL
HDLC SERPL-MCNC: 57 MG/DL (ref 40–60)
HGB UR QL STRIP.AUTO: ABNORMAL
HYALINE CASTS UR QL AUTO: NORMAL /LPF
KETONES UR QL STRIP: NEGATIVE
LDLC SERPL CALC-MCNC: 101 MG/DL (ref 0–100)
LDLC/HDLC SERPL: 1.74 {RATIO}
LEUKOCYTE ESTERASE UR QL STRIP.AUTO: NEGATIVE
NITRITE UR QL STRIP: NEGATIVE
PH UR STRIP.AUTO: 5.5 [PH] (ref 5–8)
POTASSIUM SERPL-SCNC: 4 MMOL/L (ref 3.5–5.2)
PROT SERPL-MCNC: 7.4 G/DL (ref 6–8.5)
PROT UR QL STRIP: NEGATIVE
RBC # UR STRIP: NORMAL /HPF
REF LAB TEST METHOD: NORMAL
SODIUM SERPL-SCNC: 140 MMOL/L (ref 136–145)
SP GR UR STRIP: 1.02 (ref 1–1.03)
SQUAMOUS #/AREA URNS HPF: NORMAL /HPF
TRIGL SERPL-MCNC: 88 MG/DL (ref 0–150)
TSH SERPL DL<=0.05 MIU/L-ACNC: 2.23 UIU/ML (ref 0.27–4.2)
UROBILINOGEN UR QL STRIP: ABNORMAL
VLDLC SERPL-MCNC: 16 MG/DL (ref 5–40)
WBC # UR STRIP: NORMAL /HPF

## 2025-07-20 PROBLEM — Z00.00 PREVENTATIVE HEALTH CARE: Status: ACTIVE | Noted: 2025-07-20

## 2025-07-20 PROBLEM — Z98.890 HISTORY OF COLONOSCOPY: Status: ACTIVE | Noted: 2025-07-20

## 2025-07-21 ENCOUNTER — OFFICE VISIT (OUTPATIENT)
Dept: INTERNAL MEDICINE | Facility: CLINIC | Age: 65
End: 2025-07-21
Payer: COMMERCIAL

## 2025-07-21 VITALS
HEIGHT: 62 IN | OXYGEN SATURATION: 98 % | SYSTOLIC BLOOD PRESSURE: 120 MMHG | TEMPERATURE: 97.7 F | BODY MASS INDEX: 27.64 KG/M2 | HEART RATE: 86 BPM | WEIGHT: 150.2 LBS | DIASTOLIC BLOOD PRESSURE: 82 MMHG

## 2025-07-21 DIAGNOSIS — Z00.00 PREVENTATIVE HEALTH CARE: Primary | ICD-10-CM

## 2025-07-21 DIAGNOSIS — C81.98 HODGKIN LYMPHOMA OF LYMPH NODES OF MULTIPLE REGIONS, UNSPECIFIED HODGKIN LYMPHOMA TYPE: ICD-10-CM

## 2025-07-21 DIAGNOSIS — Z98.890 HISTORY OF COLONOSCOPY: ICD-10-CM

## 2025-07-21 DIAGNOSIS — I51.7 MILD LEFT ATRIAL ENLARGEMENT: ICD-10-CM

## 2025-07-21 DIAGNOSIS — E78.2 MIXED HYPERLIPIDEMIA: ICD-10-CM

## 2025-07-21 DIAGNOSIS — I10 ESSENTIAL HYPERTENSION: ICD-10-CM

## 2025-07-21 PROCEDURE — 93000 ELECTROCARDIOGRAM COMPLETE: CPT | Performed by: INTERNAL MEDICINE

## 2025-07-21 PROCEDURE — 99396 PREV VISIT EST AGE 40-64: CPT | Performed by: INTERNAL MEDICINE

## 2025-07-21 PROCEDURE — 99213 OFFICE O/P EST LOW 20 MIN: CPT | Performed by: INTERNAL MEDICINE

## 2025-07-21 NOTE — ASSESSMENT & PLAN NOTE
Hodgkin's lymphoma treated with chemotherapy in 1990 with no evidence of recurrence with no CBC normal CMP and normal spleen and liver no adenopathy

## 2025-07-21 NOTE — PATIENT INSTRUCTIONS
Lab of June 16 noted  EKG shows left atrial enlargement unchanged  Continue current therapy  Continue excellent cardiac healthy diet with reduced carbs and weight loss  Continue excellent ADL  Return visit in November or December

## 2025-07-21 NOTE — ASSESSMENT & PLAN NOTE
Current EKG shows sinus rhythm with left atrial enlargement similar to EKG June 20, 2024 asymptomatic

## 2025-07-21 NOTE — ASSESSMENT & PLAN NOTE
Patient has considerable difficulty with colonoscopy of June 2018 showing diverticuli only and was hospitalized for lower GI bleed for approximately 2 and half days some 2 weeks after the colonoscopy,  Patient currently declines follow-up colonoscopy

## 2025-07-21 NOTE — ASSESSMENT & PLAN NOTE
Hyperlipidemia on simvastatin 40 mg daily denies myalgia or arthralgia  Lab July 16 reveals cholesterol 174  HDL 53 with normal liver function continue therapy

## 2025-07-21 NOTE — PROGRESS NOTES
[facility]     Hiwot Ren  1960   [id number]      Patient Care Team:  Kimani Adams MD as PCP - General (Internal Medicine)    Chief Complaint::   Chief Complaint   Patient presents with    Annual Exam    Hypertension    Hyperlipidemia            HPI  History of Present Illness  The patient presents for an annual preventive visit and physical examination. She has essential hypertension, hyperlipidemia, remote Hodgkin's disease, and a remote colonoscopy in 2018, with a repeat suggested in 5 years by Dr. Edgar Blanton.  Patient denies further colonoscopy because of complication and rectal bleeding with hospitalization about 2 weeks after the colonoscopy    She reports feeling well overall and has lost some weight, working hard to reach her goal of 150 pounds. She is due for a mammogram this year, with the last one performed on 04/24/2023. Her last colonoscopy was in 2018, and she has not received a notice for a repeat procedure. She has never had a colon polyp or growth. She was hospitalized for 2.5 days following a colonoscopy due to bleeding, which was attributed to an infection. She has sigmoid diverticuli but does not experience any bowel issues.  She declines further follow-up of colonoscopy she occasionally eats popcorn. She reports no chest pain, pressure, nausea, or vomiting. She continues to take vitamin D three times a week and simvastatin 20 mg daily. She has discontinued elderberry as she feels it is unnecessary.    She mentions a trace of blood in her urine, which she attributes to a recent bladder infection. She recalls an incident where she worked outside without hydrating, resulting in dark urine that cleared up after drinking fluids. However, the UTI persisted.    She experiences some sinus allergy congestion, for which she takes an antihistamine in the morning. If decongested, she takes Zyrtec-D, but this is infrequent. She reports no vision changes, cataracts, or glaucoma.  She plans to see her eye doctor later in the year. She reports no trouble driving at night, hearing loss, ringing, buzzing, nose drainage, mouth or throat problems, trouble swallowing, coughing, or wheezing. She has never smoked.    She has knee issues but manages them with exercises learned during therapy for a meniscus tear. Her job involves standing and carrying light weights, but she avoids heavy lifting. She does not walk with a limp and has not experienced her knee giving way.    PAST SURGICAL HISTORY:  - Colonoscopy in 2018         Patient Active Problem List   Diagnosis    Colitis    Essential hypertension    Hyperlipidemia    Dehydration    Leukocytosis    Lower GI bleed    Chronic allergic rhinitis    Vitamin D deficiency    Otitis    History of Hodgkin's disease    Hodgkin's disease    Ischemic colitis    Melasma    Elevated platelet count    Acute pain of right knee    Preventative health care    Preventative health care    Preventative health care    Mild left atrial enlargement    Preventative health care    History of colonoscopy        Past Medical History:   Diagnosis Date    Colitis 07/02/2015    MED RX 07/04/2018    Drug therapy     Hodgkin's lymphoma 1991    chemo-25 yrs ago     Hyperlipidemia     Hypertension        Past Surgical History:   Procedure Laterality Date    BREAST BIOPSY      BREAST CYST EXCISION      COLONOSCOPY  2018       Family History   Problem Relation Age of Onset    Breast cancer Maternal Aunt 18    Lung cancer Mother     Cancer Mother         Lung    Heart disease Father     Parkinsonism Father     Diabetes Father     Alzheimer's disease Father        Social History     Socioeconomic History    Marital status:    Tobacco Use    Smoking status: Never    Smokeless tobacco: Never   Vaping Use    Vaping status: Never Used   Substance and Sexual Activity    Alcohol use: No    Drug use: No    Sexual activity: Defer       No Known Allergies    Immunization History  "  Administered Date(s) Administered    COVID-19 (PFIZER) BIVALENT 12+YRS 01/04/2023    COVID-19 (PFIZER) Purple Cap Monovalent 03/13/2021, 04/02/2021, 12/29/2021    Fluzone (or Fluarix & Flulaval for VFC) >6mos 10/22/2020    Tdap 01/27/2016        Health Maintenance Due   Topic Date Due    Pneumococcal Vaccine 50+ (1 of 2 - PCV) Never done    ZOSTER VACCINE (1 of 2) Never done    PAP SMEAR  Never done    HEPATITIS C SCREENING  Never done    COLORECTAL CANCER SCREENING  06/13/2023    COVID-19 Vaccine (5 - 2024-25 season) 09/01/2024    ANNUAL PHYSICAL  06/20/2025    MAMMOGRAM  04/24/2025        Review of Systems   Constitutional: Negative.    HENT: Negative.     Eyes: Negative.    Respiratory: Negative.     Cardiovascular: Negative.    Gastrointestinal: Negative.    Endocrine: Negative.    Genitourinary: Negative.    Musculoskeletal:         Right knee pain arthritic with remote meniscus tear   Skin: Negative.    Allergic/Immunologic: Negative.    Hematological: Negative.       Physical Exam  Nose: Normal  Mouth/Throat: Normal  Neck: Normal, no abnormal sounds  Respiratory: Clear to auscultation, no wheezing, rales or rhonchi  Cardiovascular: Regular rate and rhythm, no murmurs, rubs, or gallops  Gastrointestinal: Liver normal, no abnormal sounds  Extremities: Circulation in the right ankle is very good, no fluid present      Vital Signs  Vitals:    07/21/25 1243   BP: 120/82   BP Location: Left arm   Patient Position: Sitting   Cuff Size: Adult   Pulse: 86   Temp: 97.7 °F (36.5 °C)   TempSrc: Infrared   SpO2: 98%   Weight: 68.1 kg (150 lb 3.2 oz)   Height: 157.5 cm (62.01\")   PainSc: 0-No pain     Body mass index is 27.46 kg/m².  BMI is >= 25 and <30. (Overweight) The following options were offered after discussion;: nutrition counseling/recommendations     Advance Care Planning         Current Outpatient Medications:     lisinopril (PRINIVIL,ZESTRIL) 5 MG tablet, TAKE 1 TABLET BY MOUTH EVERY DAY, Disp: 90 tablet, " Rfl: 3    Multiple Vitamins-Minerals (MULTIVITAMIN ADULT PO), Take 1 tablet by mouth Daily., Disp: , Rfl:     simvastatin (ZOCOR) 40 MG tablet, TAKE 1 TABLET BY MOUTH EVERY DAY IN THE EVENING, Disp: 90 tablet, Rfl: 3    vitamin D3 125 MCG (5000 UT) capsule capsule, Take 1 capsule by mouth 3 (Three) Times a Week., Disp: , Rfl:     Physical Exam   HEENT: Pupils equal no facial asymmetry pharynx is clear TMs are normal sinuses nontender  NECK: No mass bruit thyromegaly or neck vein distention  CHEST: Clear  CARDIAC: Rhythm without gallop or rub blood pressure heart rate normal  ABD: Abdomen normal positive bowel sounds no tenderness or bruit masses or adenopathy  : Deferred  NEURO: Intact  PSYCH: Normal  EXTREM: Right knee meniscus tear with good range of motion some minor instability   Skin: No rash     Results Review:    Results  Labs   - Urinalysis: Trace of blood in urine   - Thyroid test: Normal   - Lipid Panel: Total cholesterol is 174, LDL is elevated by one point, HDL is 57   - Chemistry profile: Blood sugar, kidney function, liver function, electrolyte salts are all normal   - Complete Blood Count (CBC): No anemia or problems    Imaging   - EKG: Shows mild left atrial enlargement, unchanged from previous years       ECG 12 Lead    Date/Time: 7/21/2025 6:17 PM  Performed by: Kimani Adams MD    Authorized by: Kimani Adams MD  Comparison: compared with previous ECG from 6/20/2024  Similar to previous ECG  Comparison to previous ECG: Current EKG sinus rhythm left atrial enlargement similar to EKG of June 20, 2024  Rhythm: sinus rhythm  Rate: normal  Conduction: conduction normal  ST Segments: ST segments normal  T Waves: T waves normal  QRS axis: normal  Other findings: left atrial abnormality    Clinical impression: abnormal EKG  Comments: Sinus rhythm no ischemia left atrial enlargement similar to EKG June 20, 2024 asymptomatic       lab of June 17 noted      Patient Wellness Counseling:   Plan of care  reviewed with patient at the conclusion of today's visit. Education was provided in regards to diagnosis, diet and exercise, cervical cancer screening, self breast exams, breast cancer screening, and the importance of yearly mammograms.   Nutrition, family planning/contraception, physical activity, healthy weight,ways to reduce stress, adequate sleep, injury prevention, misuse of tobacco, alcohol and drugs, sexual behavior and STD's, dental health, mental health, and immunizations.    Management and any prescribed or recommended OTC medications.  Patient verbalizes understanding of and agreement with management plan.  Medication Review: Medications reviewed and noted    Patient wellness counseling  Exercise: Encouraged continued active ADL  Diet: Encouraged healthy cardiac low-carb diet  Screening: Lab of June 17 noted  Social History     Socioeconomic History    Marital status:    Tobacco Use    Smoking status: Never    Smokeless tobacco: Never   Vaping Use    Vaping status: Never Used   Substance and Sexual Activity    Alcohol use: No    Drug use: No    Sexual activity: Defer        Assessment/Plan:  Assessment & Plan  1. Annual preventive visit and physical examination.  - EKG remains unchanged, showing minor left atrial enlargement.  - Lab results are satisfactory: normal thyroid function, cholesterol levels, chemistry profile, blood sugar, kidney function, liver function, electrolyte salts, and CBC.  - Weight is 157 pounds, BMI is 27.46, blood pressure is 120/82, heart rate is 86, and oxygen saturation is 98%.  - Advised to maintain a diet rich in fiber and exercise caution with seeds and nuts. Mammogram recommended as it has been two years since the last one on 04/24/2023.    2. Essential hypertension.  - Blood pressure is well-controlled at 120/82.  - Physical exam findings support effective management.  - Continue monitoring blood pressure regularly.  - Continue current medication regimen of  lisinopril 5 mg daily.    3. Hyperlipidemia.  - Cholesterol levels are well-managed: total cholesterol 174, LDL slightly elevated by one point, HDL 57.  - Lab results indicate effective control.  - Continue monitoring lipid levels.  - Continue current medication regimen of simvastatin 20 mg daily.    4. Remote Hodgkin's disease.  - No current issues reported.  - Physical exam and history review show no signs of recurrence.  - Continue routine monitoring.  - No specific treatment required at this time.    5. Sinus allergy congestion.  - Experiences some sinus allergy congestion.  - Physical exam findings consistent with reported symptoms.  - Discussed management with antihistamine and occasional use of Zyrtec-D.  - Continue current use of antihistamine in the morning and Zyrtec-D as needed.    6. Knee pain.  - Reports occasional knee pain and cracking.  Of right knee  - Physical exam findings and history of meniscus tear noted.  - Continue exercises learned during therapy.  - No new treatment required at this time.    7. Bladder infection.  - Trace of blood in urine likely residual from previous bladder infection.  - Lab results and patient history support this assessment.  - No current treatment required.  - Monitor for any new symptoms.    Follow-up: The patient will follow up in November or December.     Problem List Items Addressed This Visit          Cardiac and Vasculature    Essential hypertension    Overview   Essential hypertension treated with lisinopril 5 mg daily CMP normal         Current Assessment & Plan   Hypertension with current blood pressure 120/82 heart rate of 86 O2 saturation 98% on lisinopril 5 mg daily denies headache or cough continue therapy  CMP normal of June 16, 2025  Current EKG shows sinus rhythm with left atrial enlargement no change from June 20, 2024         Relevant Medications    lisinopril (PRINIVIL,ZESTRIL) 5 MG tablet    Other Relevant Orders    ECG 12 Lead    Hyperlipidemia     Overview   Mixed  Hyperlipidemia treated with simvastatin 40 mg daily denies myalgia arthralgia         Current Assessment & Plan    Hyperlipidemia on simvastatin 40 mg daily denies myalgia or arthralgia  Lab July 16 reveals cholesterol 174  HDL 53 with normal liver function continue therapy         Relevant Medications    simvastatin (ZOCOR) 40 MG tablet    Mild left atrial enlargement    Overview   Current EKG consistent with left atrial enlargement mild and similar to EKG of January 4, 2023 and asymptomatic         Current Assessment & Plan   Current EKG shows sinus rhythm with left atrial enlargement similar to EKG June 20, 2024 asymptomatic         Relevant Orders    ECG 12 Lead       Gastrointestinal Abdominal     History of colonoscopy    Overview   Colonoscopy performed June 13, 2018 with diverticuli no polyps or pathology was suggested repeat in 5 years by Dr. Edgar Blanton         Current Assessment & Plan   Patient has considerable difficulty with colonoscopy of June 2018 showing diverticuli only and was hospitalized for lower GI bleed for approximately 2 and half days some 2 weeks after the colonoscopy,  Patient currently declines follow-up colonoscopy            Health Encounters    Preventative health care - Primary    Overview   64-year-old female presents for annual preventative visit and physical examination on July 21, 2025            Hematology and Neoplasia    Hodgkin's disease    Overview   Unspecified type  Treated with chemotherapy 1990         Current Assessment & Plan   Hodgkin's lymphoma treated with chemotherapy in 1990 with no evidence of recurrence with no CBC normal CMP and normal spleen and liver no adenopathy             Patient Instructions   Lab of June 16 noted  EKG shows left atrial enlargement unchanged  Continue current therapy  Continue excellent cardiac healthy diet with reduced carbs and weight loss  Continue excellent ADL  Return visit in November or December  "    CMP:  Lab Results   Component Value Date    Glucose 90 07/16/2025    Glucose, UA Negative 07/16/2025    BUN 15.0 07/16/2025    BUN/Creatinine Ratio 16.9 07/16/2025    Creatinine 0.89 07/16/2025    Ketones, UA Negative 07/16/2025    CO2 27.1 07/16/2025    Calcium 9.9 07/16/2025    Albumin 4.6 07/16/2025    AST (SGOT) 28 07/16/2025    ALT (SGPT) 21 07/16/2025     HbA1c:  No results found for: \"HGBA1C\"  Microalbumin:  No results found for: \"MICROALBUR\", \"POCMALB\", \"POCCREAT\"  Lipid Panel  Lab Results   Component Value Date    CHOL 174 07/16/2025    TRIG 88 07/16/2025    HDL 57 07/16/2025     (H) 07/16/2025    AST 28 07/16/2025    ALT 21 07/16/2025        Counseling was given to patient for the following topics: disease prevention.    Plan of care reviewed with patient at the conclusion of today's visit. Education was provided regarding diagnosis, management, and any prescribed or recommended OTC medications.Patient verbalizes understanding of and agreement with management plan.         Patient or patient representative verbalized consent for the use of Ambient Listening during the visit with  Kimani Adams MD for chart documentation. 7/21/2025  18:21 EDT          "